# Patient Record
Sex: FEMALE | Race: WHITE | NOT HISPANIC OR LATINO | Employment: FULL TIME | URBAN - METROPOLITAN AREA
[De-identification: names, ages, dates, MRNs, and addresses within clinical notes are randomized per-mention and may not be internally consistent; named-entity substitution may affect disease eponyms.]

---

## 2019-10-29 NOTE — TELEPHONE ENCOUNTER
FUTURE VISIT INFORMATION      FUTURE VISIT INFORMATION:    Date: 11.8.19    Time: 11:15    Location: [facility]  REFERRAL INFORMATION:    Referring provider:  Dr. Anni Mcdonald    Referring providers clinic:  [place], [place]    Reason for visit/diagnosis:  severe itching all over body and trouble breathing, she does have an epipen    RECORDS REQUESTED FROM:       Clinic name Comments Records Status Imaging Status   [facility] 10.3.19 and 10.4.19 Pertinent labs Epic/CE    [facility] 5.21.19 Dr. Kelley Holliday Epic/CE    [facility] 4.24.19 Dr. Mcdonald Epic/CE    [facility] ER 4.23.19 Dr. Rolo Hawkins Epic/CE    [facility] 9.28.15 Dr. Kianna Vang Epic/CE

## 2019-11-08 ENCOUNTER — PRE VISIT (OUTPATIENT)
Dept: ALLERGY | Facility: CLINIC | Age: 44
End: 2019-11-08

## 2019-11-08 ENCOUNTER — OFFICE VISIT (OUTPATIENT)
Dept: ALLERGY | Facility: CLINIC | Age: 44
End: 2019-11-08
Payer: COMMERCIAL

## 2019-11-08 DIAGNOSIS — T78.2XXA ANAPHYLAXIS, INITIAL ENCOUNTER: Primary | ICD-10-CM

## 2019-11-08 DIAGNOSIS — Z91.018 FOOD ALLERGY: ICD-10-CM

## 2019-11-08 RX ORDER — CETIRIZINE HYDROCHLORIDE 10 MG/1
10 TABLET ORAL PRN
Qty: 2 TABLET | Refills: 3 | Status: SHIPPED | OUTPATIENT
Start: 2019-11-08 | End: 2019-11-08 | Stop reason: DRUGHIGH

## 2019-11-08 RX ORDER — PREDNISONE 50 MG/1
100 TABLET ORAL DAILY
Qty: 2 TABLET | Refills: 3 | Status: SHIPPED | OUTPATIENT
Start: 2019-11-08 | End: 2019-11-08

## 2019-11-08 RX ORDER — EPINEPHRINE 0.3 MG/.3ML
0.3 INJECTION SUBCUTANEOUS ONCE
Status: CANCELLED | OUTPATIENT
Start: 2019-11-08 | End: 2019-11-08

## 2019-11-08 RX ORDER — CETIRIZINE HYDROCHLORIDE 10 MG/1
10 TABLET ORAL ONCE
Status: CANCELLED | OUTPATIENT
Start: 2019-11-08 | End: 2019-11-08

## 2019-11-08 RX ORDER — CETIRIZINE HYDROCHLORIDE 10 MG/1
10 TABLET ORAL PRN
Qty: 2 TABLET | Refills: 3 | Status: SHIPPED | OUTPATIENT
Start: 2019-11-08 | End: 2019-11-08

## 2019-11-08 RX ORDER — PREDNISONE 50 MG/1
100 TABLET ORAL DAILY
Qty: 2 TABLET | Refills: 3 | Status: SHIPPED | OUTPATIENT
Start: 2019-11-08

## 2019-11-08 RX ORDER — PREDNISONE 50 MG/1
100 TABLET ORAL DAILY
Qty: 2 TABLET | Refills: 3 | Status: SHIPPED | OUTPATIENT
Start: 2019-11-08 | End: 2019-11-08 | Stop reason: DRUGHIGH

## 2019-11-08 RX ORDER — CETIRIZINE HYDROCHLORIDE 10 MG/1
10 TABLET ORAL PRN
Qty: 2 TABLET | Refills: 3 | Status: SHIPPED | OUTPATIENT
Start: 2019-11-08

## 2019-11-08 ASSESSMENT — PAIN SCALES - GENERAL: PAINLEVEL: NO PAIN (0)

## 2019-11-08 NOTE — PROGRESS NOTES
[facility] Note      Allergy Problem List:  1. Anaphylactic reaction to [employer] chicken yoly pasta bowl  -Was prescribed emergency set    Encounter Date: Nov 8, 2019    CC:  Chief Complaint   Patient presents with     Allergies     In April had anaphylaxis reaction to unknown. Patient saw an allergist could not figure it out, gave her an epi pen. Patient had skin testing 20+ years ago and are on allergy list, Medications are based on symptoms not testing.          History of Present Illness:  Ms. Venice Childers is a 44 year old female who presents as a referral from Dr. Anni Mathias for a second opinion for an anaphylactic reaction she had on 04/23/2019. That morning, the patient was at home. 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 She recalls having a peanut butter sandwich for breakfast and stumbled after putting out the trash due to nerve damage in a leg. She then went to sleep and woke up to have dinner which consisted only of a diet coke and a chicken yoly pasta bowl from [employer] which she was getting for the first time. She has previously had food from [employer] that contains yoly. She consumed a third of the meal and developed pruritus over her whole body 10 minutes later. She then went to go take a shower and noticed her throat starting to close. She called 911 and was given epinephrine by the paramedics which stopped the throat closing. Her blood pressure was 67/43, normally it is around 140/80. On the way to the hospital, she was continued on epinephrine and started on diphenhydramine, methylprednisolone, and D5 1/2 NS. The itching subsided when she got to the hospital. After several hours of monitoring to ensure no reaction developing, she went home. She later contacted the [employer] to see if any of the workers that night were on antibiotics as she is allergic to penicillins. The manager later checked and reported that no one was  taking antibiotics. The patient has been avoiding [employer] pasta bowl and yoly-containing food items since the reaction. She has ate from that [email or web address] store since the reaction.    On 05/21/2019, she went to go see an allergist in [place]. Blood tests were conducted to determine if she had any allergies to bees as well as the main ingredients of the meal without any positive results. She was advised to prescribed an epi pen. The patient had a prick test when she was 21-years-old with dust mites, birch trees, and a mold having positive results. Also when she was that age, she developed hives and eyes swelling at 2AM in [place], [place]. The ER doctors there informed her that she may be allergic to MSG as she had Chinese food the night before. The patient denies having hay fever and asthma. She reports that she does have Hashimoto's thyroiditis but no other autoimmune disease.    Past Medical History:   There is no problem list on file for this patient.    No past medical history on file.  No past surgical history on file.    Social History:  Patient     Family History:  No family history on file.    Medications:  No current outpatient medications on file.       Allergies   Allergen Reactions     Other  [No Clinical Screening - See Comments] Other (See Comments) and Shortness Of Breath     GRASS /DUST/  BIRCH TREES     Adhesive Tape Rash     Paper tape       Birch Trees Headache     Cipro Hc Hives     Ciprofloxacin Hives     Dust Mites      Erythromycin Hives     Sulfa Drugs Hives     Penicillins Hives, Itching and Rash       Review of Systems:  -As per HPI  -Constitutional: Otherwise feeling well today, in usual state of health.  -HEENT: Patient denies nonhealing oral sores.  -Skin: As above in HPI. No additional skin concerns.    Physical exam:  Vitals: There were no vitals taken for this visit.  GEN: This is a well developed, well-nourished female in no acute distress, in a pleasant mood.    SKIN: Not  performed.    Atopy Screen (Placed 11/09/2019)    No Substance Readings (15 min) Evaluation   POS Histamine 1mg/ml -    NEG NaCl 0.9% -      No Substance Readings (15 min) Evaluation   1 Alternaria alternata (tenuis)  -    2 Cladosporium herbarum -    3 Aspergillus fumigatus -    4 Penicillium notatum -    5 Dermatophagoides pteronyssinus -    6 Dermatophagoides farinae -    7 Dog epithelium (canis spp) -    8 Cat hair (addison catus) -    9 Cockroach   (Blatella americana & germanica) -    10 Grass mix midwest   (Deidre, Orchard, Redtop, Jefferson) -    11 Alvarado grass (sorghum halepense) -    12 Weed mix   (common Cocklebur, Lamb s quarters, rough redroot Pigweed, Dock/Sorrel) -    13 Mug wort (artemisia vulgare) -    14 Ragweed giant/short (ambrosia spp) -    15 English Plantain (plantago lanceolata) -    16 Tree mix 1 (Pecan, Maple BHR, Oak RVW, american [place], black [place]) -    17 Red cedar (juniperus virginia) -    18 Tree mix 2   (white Iam, river/red Birch, black [place], common [place], american Elm) -    19 Box elder/Maple mix (acer spp) -    20 [place] shagbark (carya ovata) -    F  White flour -      Conclusion: None      Final diagnosis:    >> Anaphylactic reaction, likely due to a component of [employer] chicken yoly pasta bowl    Prescribed emergency set of 2x50mg prednisone and 2x10mg cetirizine for allergic reactions. Instructions: If severe allergic reaction, immediately take two tablets of prednisone 50mg and 2 tablets of cetirizine 10mg and then write precise 12h retrospective diary. If less severe reaction, take only the 2 tablets of cetirizine 10mg.    Repeat prick test (atopy screen and white flour) at a future visit as the patient took an antihistamine yesterday and the prick test had a negative results for the positive control.    Recommended to continue avoiding the pasta bowls and yoly-containing items from [employer]    At the next visit, will conduct a food protein panel with the  [employer] chicken yoly pasta bowl which the patient will bring it.      Treatment prescribed/Plan:  Pt was undergoing prick test today but took an antihistamine, zyrtec, yesterday. She shares that she was not aware not to take antihistamines so we will repeat at the next visit.     Follow up in 2 weeks. Informed pt not to take any antihistamines for the prick test.      CC Anni [facility]  [address] [id number] on close of this encounter.      Staff Involved:  Scribe/Staff    I, Kevin Hart MS3, saw and examined the patient in the presence of Dr. Krishna Hines.    I spent a total of 30 minutes face to face with Venice Childers during today s office visit. Over 50% of this time was spent counseling the patient and/or coordinating care. Please see Assessment and Plan for details    --> will not charge for the prick test, because patient was on antihistamines and we did not ask previously!

## 2019-11-08 NOTE — PROGRESS NOTES
Patient had 223 prick tests placed this visit.    Control Tests (2 tests)    Standard Atopic Panel (21 tests)

## 2019-11-08 NOTE — PROGRESS NOTES
Atopy Screen (Placed 11/08/19 )    No Substance Readings (15 min) Evaluation   POS Histamine 1mg/ml -    NEG NaCl 0.9% -      No Substance Readings (15 min) Evaluation   1 Alternaria alternata (tenuis)  -    2 Cladosporium herbarum -    3 Aspergillus fumigatus -    4 Penicillium notatum -    5 Dermatophagoides pteronyssinus -    6 Dermatophagoides farinae -    7 Dog epithelium (canis spp) -    8 Cat hair (addison catus) -    9 Cockroach   (Blatella americana & germanica) -    10 Grass mix [facility]   (Deidre, Orchard, Redtop, Jefferson) -    11 Alvarado grass (sorghum halepense) -    12 Weed mix   (common Cocklebur, Lamb s quarters, rough redroot Pigweed, Dock/Sorrel) -    13 Mug wort (artemisia vulgare) -    14 Ragweed giant/short (ambrosia spp) -    15 English Plantain (plantago lanceolata) -    16 Tree mix 1 (Pecan, Maple BHR, Oak RVW, american [place], black [place]) -    17 Red cedar (juniperus virginia) -    18 Tree mix 2   (white Iam, river/red Birch, black [place], common [place], american Elm) -    19 Box elder/Maple mix (acer spp) -    20 [place] shagbark (carya ovata) -    F  White flour -      Conclusion:

## 2019-11-08 NOTE — LETTER
11/8/2019         RE: Venice Childers  [address] [address]        Dear Colleague,    Thank you for referring your patient, Venice Childers, to the [facility]. Please see a copy of my visit note below.    [facility] Note      Allergy Problem List:  1. Anaphylactic reaction to [employer] chicken yoly pasta bowl  -Was prescribed emergency set    Encounter Date: Nov 8, 2019    CC:  Chief Complaint   Patient presents with     Allergies     In April had anaphylaxis reaction to unknown. Patient saw an allergist could not figure it out, gave her an epi pen. Patient had skin testing 20+ years ago and are on allergy list, Medications are based on symptoms not testing.          History of Present Illness:  Ms. Venice Childers is a 44 year old female who presents as a referral from Dr. Anni Mathias for a second opinion for an anaphylactic reaction she had on 04/23/2019. That morning, the patient was at home. 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 She recalls having a peanut butter sandwich for breakfast and stumbled after putting out the trash due to nerve damage in a leg. She then went to sleep and woke up to have dinner which consisted only of a diet coke and a chicken yoly pasta bowl from [employer] which she was getting for the first time. She has previously had food from [employer] that contains yoly. She consumed a third of the meal and developed pruritus over her whole body 10 minutes later. She then went to go take a shower and noticed her throat starting to close. She called 911 and was given epinephrine by the paramedics which stopped the throat closing. Her blood pressure was 67/43, normally it is around 140/80. On the way to the hospital, she was continued on epinephrine and started on diphenhydramine, methylprednisolone, and D5 1/2 NS. The itching subsided when she got to the hospital. After several hours of monitoring to  ensure no reaction developing, she went home. She later contacted the [employer] to see if any of the workers that night were on antibiotics as she is allergic to penicillins. The manager later checked and reported that no one was taking antibiotics. The patient has been avoiding [employer] pasta bowl and yoly-containing food items since the reaction. She has ate from that [employer] store since the reaction.    On 05/21/2019, she went to go see an allergist in [place]. Blood tests were conducted to determine if she had any allergies to bees as well as the main ingredients of the meal without any positive results. She was advised to prescribed an epi pen. The patient had a prick test when she was 21-years-old with dust mites, birch trees, and a mold having positive results. Also when she was that age, she developed hives and eyes swelling at 2AM in [place], [place]. The ER doctors there informed her that she may be allergic to MSG as she had Chinese food the night before. The patient denies having hay fever and asthma. She reports that she does have Hashimoto's thyroiditis but no other autoimmune disease.    Past Medical History:   There is no problem list on file for this patient.    No past medical history on file.  No past surgical history on file.    Social History:  Patient     Family History:  No family history on file.    Medications:  No current outpatient medications on file.       Allergies   Allergen Reactions     Other  [No Clinical Screening - See Comments] Other (See Comments) and Shortness Of Breath     GRASS /DUST/  BIRCH TREES     Adhesive Tape Rash     Paper tape       Birch Trees Headache     Cipro Hc Hives     Ciprofloxacin Hives     Dust Mites      Erythromycin Hives     Sulfa Drugs Hives     Penicillins Hives, Itching and Rash       Review of Systems:  -As per HPI  -Constitutional: Otherwise feeling well today, in usual state of health.  -HEENT: Patient denies nonhealing oral  sores.  -Skin: As above in HPI. No additional skin concerns.    Physical exam:  Vitals: There were no vitals taken for this visit.  GEN: This is a well developed, well-nourished female in no acute distress, in a pleasant mood.    SKIN: Not performed.    Atopy Screen (Placed 11/09/2019)    No Substance Readings (15 min) Evaluation   POS Histamine 1mg/ml -    NEG NaCl 0.9% -      No Substance Readings (15 min) Evaluation   1 Alternaria alternata (tenuis)  -    2 Cladosporium herbarum -    3 Aspergillus fumigatus -    4 Penicillium notatum -    5 Dermatophagoides pteronyssinus -    6 Dermatophagoides farinae -    7 Dog epithelium (canis spp) -    8 Cat hair (addison catus) -    9 Cockroach   (Blatella americana & germanica) -    10 Grass mix midwest   (Deidre, Orchard, Redtop, Jefferson) -    11 Alvarado grass (sorghum halepense) -    12 Weed mix   (common Cocklebur, Lamb s quarters, rough redroot Pigweed, Dock/Sorrel) -    13 Mug wort (artemisia vulgare) -    14 Ragweed giant/short (ambrosia spp) -    15 English Plantain (plantago lanceolata) -    16 Tree mix 1 (Pecan, Maple BHR, Oak RVW, american [place], black [place]) -    17 Red cedar (juniperus virginia) -    18 Tree mix 2   (white Iam, river/red Birch, black [place], common [place], american Elm) -    19 Box elder/Maple mix (acer spp) -    20 [place] shagbark (carya ovata) -    F  White flour -      Conclusion: None      Final diagnosis:    >> Anaphylactic reaction, likely due to a component of [employer] chicken [employer] bowl    Prescribed emergency set of 2x50mg prednisone and 2x10mg cetirizine for allergic reactions. Instructions: If severe allergic reaction, immediately take two tablets of prednisone 50mg and 2 tablets of cetirizine 10mg and then write precise 12h retrospective diary. If less severe reaction, take only the 2 tablets of cetirizine 10mg.    Repeat prick test (atopy screen and white flour) at a future visit as the patient took an antihistamine  yesterday and the prick test had a negative results for the positive control.    Recommended to continue avoiding the pasta bowls and yoly-containing items from [employer]    At the next visit, will conduct a food protein panel with the [employer] chicken yoly pasta bowl which the patient will bring it.      Treatment prescribed/Plan:  Pt was undergoing prick test today but took an antihistamine, zyrtec, yesterday. She shares that she was not aware not to take antihistamines so we will repeat at the next visit.     Follow up in 2 weeks. Informed pt not to take any antihistamines for the prick test.      CC [facility]  [address] [place] [address] on close of this encounter.      Staff Involved:  Scribe/Staff    I, Kevin Hart MS3, saw and examined the patient in the presence of Dr. Krishna Hines.    I spent a total of 30 minutes face to face with Venice Childers during today s office visit. Over 50% of this time was spent counseling the patient and/or coordinating care. Please see Assessment and Plan for details    --> will not charge for the prick test, because patient was on antihistamines and we did not ask previously!              Patient had 223 prick tests placed this visit.    Control Tests (2 tests)    Standard Atopic Panel (21 tests)          Atopy Screen (Placed 11/08/19 )    No Substance Readings (15 min) Evaluation   POS Histamine 1mg/ml -    NEG NaCl 0.9% -      No Substance Readings (15 min) Evaluation   1 Alternaria alternata (tenuis)  -    2 Cladosporium herbarum -    3 Aspergillus fumigatus -    4 Penicillium notatum -    5 Dermatophagoides pteronyssinus -    6 Dermatophagoides farinae -    7 Dog epithelium (canis spp) -    8 Cat hair (addison catus) -    9 Cockroach   (Blatella americana & germanica) -    10 Grass mix midwest   (Deidre, Orchard, Redtop, [place]) -    11 Alvarado grass (sorghum halepense) -    12 Weed mix   (common Cocklebur,  Hansen s quarters, rough redroot Pigweed, Dock/Sorrel) -    13 Mug wort (artemisia vulgare) -    14 Ragweed giant/short (ambrosia spp) -    15 English Plantain (plantago lanceolata) -    16 Tree mix 1 (Pecan, Maple BHR, Oak RVW, american [place], black [place]) -    17 Red cedar (juniperus virginia) -    18 Tree mix 2   (white Iam, river/red Birch, black [place], common [place], american Elm) -    19 Box elder/Maple mix (acer spp) -    20 [place] shagbark (carya ovata) -    F  White flour -      Conclusion:                Again, thank you for allowing me to participate in the care of your patient.        Sincerely,        Krishna Hines MD

## 2019-11-08 NOTE — NURSING NOTE
Chief Complaint   Patient presents with     Allergies     In April had anaphylaxis reaction to unknown. Patient saw an allergist could not figure it out, gave her an epi pen. Patient had skin testing 20+ years ago and are on allergy list, Medications are based on symptoms not testing.      Elvi Cardozo LPN

## 2019-11-22 ENCOUNTER — OFFICE VISIT (OUTPATIENT)
Dept: ALLERGY | Facility: CLINIC | Age: 44
End: 2019-11-22
Payer: COMMERCIAL

## 2019-11-22 DIAGNOSIS — Z91.018 FOOD ALLERGY: ICD-10-CM

## 2019-11-22 DIAGNOSIS — T78.2XXA ANAPHYLAXIS, INITIAL ENCOUNTER: Primary | ICD-10-CM

## 2019-11-22 ASSESSMENT — PAIN SCALES - GENERAL: PAINLEVEL: NO PAIN (0)

## 2019-11-22 NOTE — NURSING NOTE
Chief Complaint   Patient presents with     Allergy Testing Followup     repeat prick tests along with patient's own product. Patient has been of her antihistamines     Elvi Cardozo LPN

## 2019-11-22 NOTE — PROGRESS NOTES
[facility] Allergy Note    Allergy Clinic  [facility]  [address] [place] [id number]    Allergy Problem List:  1. Anaphylactic reaction to [employer] chicken yoly pasta bowl  -Was prescribed emergency set    Encounter Date: Nov 22, 2019    CC:  Chief Complaint   Patient presents with     Allergy Testing Followup     repeat prick tests along with patient's own product. Patient has been of her antihistamines       History of Present Illness:  Ms. Venice Childers is a 44 year old female who presents as a referral from Dr. Anni Mathias for a second opinion for an anaphylactic reaction she had on 04/23/2019. That morning, the patient was at home. 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 She recalls having a peanut butter sandwich for breakfast and stumbled after putting out the trash due to nerve damage in a leg. She then went to sleep and woke up to have dinner which consisted only of a diet coke and a chicken yoly pasta bowl from [employer] which she was getting for the first time. She has previously had food from [employer] that contains yoly. She consumed a third of the meal and developed pruritus over her whole body 10 minutes later. She then went to go take a shower and noticed her throat starting to close. She called 911 and was given epinephrine by the paramedics which stopped the throat closing. Her blood pressure was 67/43, normally it is around 140/80. On the way to the hospital, she was continued on epinephrine and started on diphenhydramine, methylprednisolone, and D5 1/2 NS. The itching subsided when she got to the hospital. After several hours of monitoring to ensure no reaction developing, she went home. She later contacted the [employer] to see if any of the workers that night were on antibiotics as she is allergic to penicillins. The manager later checked and reported that no one was taking  antibiotics. The patient has been avoiding [employer] pasta bowl and yoly-containing food items since the reaction. She has ate from that [employer] store since the reaction.    On 05/21/2019, she went to go see an allergist in [place]. Blood tests were conducted to determine if she had any allergies to bees as well as the main ingredients of the meal without any positive results. She was advised to prescribed an epi pen. The patient had a prick test when she was 21-years-old with dust mites, birch trees, and a mold having positive results. Also when she was that age, she developed hives and eyes swelling at 2AM in [place], [place]. The ER doctors there informed her that she may be allergic to MSG as she had Chinese food the night before. The patient denies having hay fever and asthma. She reports that she does have Hashimoto's thyroiditis but no other autoimmune disease.    Hx since 11/8/19:  Pt comes in today, 11/22/19, for a repeat of the atopy screen prick testing. Last time in this clinic she had undergone atopy screen prick testing but was found to be on antihistamines. She was given an emergency set before rescheduling. Patient's Hx indicates that she has a severe immediate type reaction to penicillins/minor penicillins. There were incidents of droplet exposure with amoxicillin on the hand during preparation of amoxicillin injection, and she developed in 30 minutes generalized urticaria. In addition, she has observed that her  was feeding the dog amoxicillin and touched things in the kitchen.   The patient is otherwise feeling well overall. No other/additional allergy concerns at this time.      Past Medical History:   There is no problem list on file for this patient.    No past medical history on file.  No past surgical history on file.    Social History:  Patient works as a nurse.    Family History:  No family history on file.    Medications:  Current Outpatient Medications   Medication Sig  Dispense Refill     cetirizine (ZYRTEC) 10 MG tablet Take 1 tablet (10 mg) by mouth as needed for allergies (emergency set) 2 tablet 3     predniSONE (DELTASONE) 50 MG tablet Take 2 tablets (100 mg) by mouth daily 2 tablet 3       Allergies   Allergen Reactions     Other  [No Clinical Screening - See Comments] Other (See Comments) and Shortness Of Breath     GRASS /DUST/  BIRCH TREES     Adhesive Tape Rash     Paper tape       Birch Trees Headache     Cipro Hc Hives     Ciprofloxacin Hives     Dust Mites      Erythromycin Hives     Sulfa Drugs Hives     Penicillins Hives, Itching and Rash       Review of Systems:  -As per HPI  -Constitutional: Otherwise feeling well today, in usual state of health.  -HEENT: Patient denies nonhealing oral sores.  -Skin: As above in HPI. No additional skin concerns.    Physical exam:  Vitals: There were no vitals taken for this visit.  GEN: This is a well developed, well-nourished female in no acute distress, in a pleasant mood.    SKIN: Not performed.    Atopy Screen (Placed 11/09/2019)    No Substance Readings (15 min) Evaluation   POS Histamine 1mg/ml -    NEG NaCl 0.9% -      No Substance Readings (15 min) Evaluation   1 Alternaria alternata (tenuis)  -    2 Cladosporium herbarum -    3 Aspergillus fumigatus -    4 Penicillium notatum -    5 Dermatophagoides pteronyssinus -    6 Dermatophagoides farinae -    7 Dog epithelium (canis spp) -    8 Cat hair (addison catus) -    9 Cockroach   (Blatella americana & germanica) -    10 Grass mix midwest   (Deidre, Orchard, Redtop, Jefferson) -    11 Alvarado grass (sorghum halepense) -    12 Weed mix   (common Cocklebur, Lamb s quarters, rough redroot Pigweed, Dock/Sorrel) -    13 Mug wort (artemisia vulgare) -    14 Ragweed giant/short (ambrosia spp) -    15 English Plantain (plantago lanceolata) -    16 Tree mix 1 (Pecan, Maple BHR, Oak RVW, american [place], black [place]) -    17 Red cedar (juniperus virginia) -    18 Tree mix 2   (white Iam,  river/red Birch, black [place], common [place], american Elm) -    19 Box elder/Maple mix (acer spp) -    20 [place] shagbark (carya ovata) -    F  White flour -      Conclusion: None    Patient had 24 prick tests placed this visit.    Control Tests (2 tests)    Standard Atopic Panel (21 tests)    Personal Products ( 1 tests)    Atopy Screen (Placed 11/22/19 )    No Substance Readings (15 min) Evaluation   POS Histamine 1mg/ml ++    NEG NaCl 0.9% -      No Substance Readings (15 min) Evaluation   1 Alternaria alternata (tenuis)  -    2 Cladosporium herbarum -    3 Aspergillus fumigatus -    4 Penicillium notatum -    5 Dermatophagoides pteronyssinus -    6 Dermatophagoides farinae -    7 Dog epithelium (canis spp) -    8 Cat hair (addison catus) -    9 Cockroach   (Blatella americana & germanica) -    10 Grass mix midwest   ([place]idre, Orchard, Red[place], [place]) -    11 Alvarado grass (sorghum halepense) -    12 Weed mix   (common Cocklebur, Lamb s quarters, rough redroot Pigweed, Dock/Sorrel) -    13 Mug wort (artemisia vulgare) -    14 Ragweed giant/short (ambrosia spp) -    15 English Plantain (plantago lanceolata) -    16 Tree mix 1 (Pecan, Maple BHR, Oak RVW, american [place], black [place]) -    17 Red cedar (juniperus virginia) -    18 Tree mix 2   (white Iam, river/red Birch, black [place], common [place], american Elm) -    19 Box elder/Maple mix (acer spp) -    20 [place] shagbark (carya ovata) -    21 [employer] Gallo Bread Bowl     22 White flour  -      Conclusion: Negative atopy screen prick test. No signs for atopic predisposition.       Milk, Meat, Egg, and Grains (11/22/19 )    No Substance Readings (15min) Evaluation   POS Histamine 1mg/ml ++    NEG NaCl 0.9% -      No Substance Readings (15min) Evaluation   1 Cows milk whole (karina paras) -    2 Casein (karina paras) -    3 Beef (karina paras) -    4 Goat milk -    5 Lamb (ovis wild) -    6 Egg white (gallus gallus) -    7 Egg yolk (gallus gallus) -    8  Chicken (gallus gallus) -    9 Turkey (meleagris gallopavo) -    10 Pork (chema scrofa) -    11 Barley (hordeum vulgare) +    12 Buckwheat (fagopyrum esculentum) -    13 Corn (michael mais) -    14 Hops (humulus lupulus)  +    15 Malt (hordeum vulgare)  +    16 Oat (federico sativa) -    17 Rice (oryza sativa) -    18 Rye (secale cereale) -    19 Whole wheat (triticum aestivum) +    20  White wheat flour - Patients own      Conclusion: There was a certain dermographism on the back with a few food allergens especially on the wheat panel, slightly positive. However, compared to the positive control, the reaction was very slight that I consider it as not relevant.       Final diagnosis:    >> Anaphylactic reaction, likely due to a component of [employer] chicken yoly pasta bowl    Previously prescribed emergency set of 2x50mg prednisone and 2x10mg cetirizine for allergic reactions. Instructions: If severe allergic reaction, immediately take two tablets of prednisone 50mg and 2 tablets of cetirizine 10mg and then write precise 12h retrospective diary. If less severe reaction, take only the 2 tablets of cetirizine 10mg.    Recommended to continue avoiding the pasta bowls and yoly-containing items from [employer]    >> Suspicion for severe immediate-type sensitivity to penicillin G and minor penicillins    Explore possible cross reaction into cephalosporin and meropenem     Plan prick and intradermal test to Penicllin G, Ampicillin, cefazolin, ceftriaxone, and meropenem      Treatment prescribed/Plan:  - Pt to use the emergency set if needed, and then follow up with us for more focused allergy testing.     Follow up as needed.       [facility]  [address] [address] on close of this encounter.      Staff Involved:    Scribe Disclosure  I, Liza Mccurdy, am serving as a scribe to document services personally performed by Dr. Krishna Hines, based on data collection and  the provider's statements to me.     I spent a total of 32 minutes face to face with Venice Childers during today s office visit. Over 50% of this time was spent counseling the patient and/or coordinating care.  Please see Assessment and Plan for details.  This excludes any time spent performing prick tests

## 2019-11-22 NOTE — LETTER
11/22/2019         RE: Venice Childers  [address] [id number]        Dear Colleague,    Thank you for referring your patient, Venice Childers, to the [facility] ALLERGY. Please see a copy of my visit note below.    Patient had 24 prick tests placed this visit.    Control Tests (2 tests)    Standard Atopic Panel (21 tests)    Personal Products ( 1 tests)    Atopy Screen (Placed 11/22/19 )    No Substance Readings (15 min) Evaluation   POS Histamine 1mg/ml -    NEG NaCl 0.9% -      No Substance Readings (15 min) Evaluation   1 Alternaria alternata (tenuis)  -    2 Cladosporium herbarum -    3 Aspergillus fumigatus -    4 Penicillium notatum -    5 Dermatophagoides pteronyssinus -    6 Dermatophagoides farinae -    7 Dog epithelium (canis spp) -    8 Cat hair (addison catus) -    9 Cockroach   (Blatella americana & germanica) -    10 Grass mix midwest   (Deidre, Orchard, Redtop, [place]) -    11 Alvarado grass (sorghum halepense) -    12 Weed mix   (common Cocklebur, Lamb s quarters, rough redroot Pigweed, Dock/Sorrel) -    13 Mug wort (artemisia vulgare) -    14 Ragweed giant/short (ambrosia spp) -    15 English Plantain (plantago lanceolata) -    16 Tree mix 1 (Pecan, Maple BHR, Oak RVW, american [place], black [place]) -    17 Red cedar (juniperus virginia) -    18 Tree mix 2   (white Iam, river/red Birch, black [place], common [place], american Elm) -    19 Box elder/Maple mix (acer spp) -    20 [place] shagbark (carya ovata) -    21 [employer] Yoly Bread Bowl     22 White flour  -      Conclusion:      [facility] Allergy Note    Allergy Clinic  [facility]  [address] [place] [id number]    Allergy Problem List:  1. Anaphylactic reaction to [employer] chicken yoly pasta bowl  -Was prescribed emergency set    Encounter Date: Nov 22, 2019    CC:  Chief Complaint   Patient presents with     Allergy Testing Followup      repeat prick tests along with patient's own product. Patient has been of her antihistamines       History of Present Illness:  Ms. Venice Childers is a 44 year old female who presents as a referral from Dr. Anni Mathias for a second opinion for an anaphylactic reaction she had on 04/23/2019. That morning, the patient was at home. 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 She recalls having a peanut butter sandwich for breakfast and stumbled after putting out the trash due to nerve damage in a leg. She then went to sleep and woke up to have dinner which consisted only of a diet coke and a chicken yoly pasta bowl from [employer] which she was getting for the first time. She has previously had food from [employer] that contains yoly. She consumed a third of the meal and developed pruritus over her whole body 10 minutes later. She then went to go take a shower and noticed her throat starting to close. She called 911 and was given epinephrine by the paramedics which stopped the throat closing. Her blood pressure was 67/43, normally it is around 140/80. On the way to the hospital, she was continued on epinephrine and started on diphenhydramine, methylprednisolone, and D5 1/2 NS. The itching subsided when she got to the hospital. After several hours of monitoring to ensure no reaction developing, she went home. She later contacted the [employer] to see if any of the workers that night were on antibiotics as she is allergic to penicillins. The manager later checked and reported that no one was taking antibiotics. The patient has been avoiding [employer] pasta bowl and yoly-containing food items since the reaction. She has ate from that [employer] since the reaction.    On 05/21/2019, she went to go see an allergist in [place]. Blood tests were conducted to determine if she had any allergies to bees as well as the main ingredients of the meal without any positive results. She was advised  to prescribed an epi pen. The patient had a prick test when she was 21-years-old with dust mites, birch trees, and a mold having positive results. Also when she was that age, she developed hives and eyes swelling at 2AM in [place], [place]. The ER doctors there informed her that she may be allergic to MSG as she had Chinese food the night before. The patient denies having hay fever and asthma. She reports that she does have Hashimoto's thyroiditis but no other autoimmune disease.    Hx since 11/8/19:  Pt comes in today, 11/22/19, for a repeat of the atopy screen prick testing. Last time in this clinic she had undergone atopy screen prick testing but was found to be on antihistamines. She was given an emergency set before rescheduling. Patient's Hx indicates that she has a severe immediate type reaction to penicillins/minor penicillins. There were incidents of droplet exposure with amoxicillin on the hand during preparation of amoxicillin injection, and she developed in 30 minutes generalized urticaria. In addition, she has observed that her  was feeding the dog amoxicillin and touched things in the kitchen.   The patient is otherwise feeling well overall. No other/additional allergy concerns at this time.      Past Medical History:   There is no problem list on file for this patient.    No past medical history on file.  No past surgical history on file.    Social History:  Patient works as a nurse.    Family History:  No family history on file.    Medications:  Current Outpatient Medications   Medication Sig Dispense Refill     cetirizine (ZYRTEC) 10 MG tablet Take 1 tablet (10 mg) by mouth as needed for allergies (emergency set) 2 tablet 3     predniSONE (DELTASONE) 50 MG tablet Take 2 tablets (100 mg) by mouth daily 2 tablet 3       Allergies   Allergen Reactions     Other  [No Clinical Screening - See Comments] Other (See Comments) and Shortness Of Breath     GRASS /DUST/  BIRCH TREES     Adhesive Tape  Rash     Paper tape       Birch Trees Headache     Cipro Hc Hives     Ciprofloxacin Hives     Dust Mites      Erythromycin Hives     Sulfa Drugs Hives     Penicillins Hives, Itching and Rash       Review of Systems:  -As per HPI  -Constitutional: Otherwise feeling well today, in usual state of health.  -HEENT: Patient denies nonhealing oral sores.  -Skin: As above in HPI. No additional skin concerns.    Physical exam:  Vitals: There were no vitals taken for this visit.  GEN: This is a well developed, well-nourished female in no acute distress, in a pleasant mood.    SKIN: Not performed.    Atopy Screen (Placed 11/09/2019)    No Substance Readings (15 min) Evaluation   POS Histamine 1mg/ml -    NEG NaCl 0.9% -      No Substance Readings (15 min) Evaluation   1 Alternaria alternata (tenuis)  -    2 Cladosporium herbarum -    3 Aspergillus fumigatus -    4 Penicillium notatum -    5 Dermatophagoides pteronyssinus -    6 Dermatophagoides farinae -    7 Dog epithelium (canis spp) -    8 Cat hair (addison catus) -    9 Cockroach   (Blatella americana & germanica) -    10 Grass mix midwest   (Deidre, Orchard, Redtop, [place]) -    11 Alvarado grass (sorghum halepense) -    12 Weed mix   (common Cocklebur, Lamb s quarters, rough redroot Pigweed, Dock/Sorrel) -    13 Mug wort (artemisia vulgare) -    14 Ragweed giant/short (ambrosia spp) -    15 English Plantain (plantago lanceolata) -    16 Tree mix 1 (Pecan, Maple BHR, Oak RVW, american [place], black [place]) -    17 Red cedar (juniperus virginia) -    18 Tree mix 2   (white Iam, river/red Birch, black [place], common [place], american Elm) -    19 Box elder/Maple mix (acer spp) -    20 [place] shagbark (carya ovata) -    F  White flour -      Conclusion: None    Patient had 24 prick tests placed this visit.    Control Tests (2 tests)    Standard Atopic Panel (21 tests)    Personal Products ( 1 tests)    Atopy Screen (Placed 11/22/19 )    No Substance Readings (15 min)  Evaluation   POS Histamine 1mg/ml ++    NEG NaCl 0.9% -      No Substance Readings (15 min) Evaluation   1 Alternaria alternata (tenuis)  -    2 Cladosporium herbarum -    3 Aspergillus fumigatus -    4 Penicillium notatum -    5 Dermatophagoides pteronyssinus -    6 Dermatophagoides farinae -    7 Dog epithelium (canis spp) -    8 Cat hair (addison catus) -    9 Cockroach   (Blatella americana & germanica) -    10 Grass mix midwest   (Deidre, Orchard, Redtop, [place]) -    11 Alvarado grass (sorghum halepense) -    12 Weed mix   (common Cocklebur, Lamb s quarters, rough redroot Pigweed, Dock/Sorrel) -    13 Mug wort (artemisia vulgare) -    14 Ragweed giant/short (ambrosia spp) -    15 English Plantain (plantago lanceolata) -    16 Tree mix 1 (Pecan, Maple BHR, Oak RVW, american [place], black [place]) -    17 Red cedar (juniperus virginia) -    18 Tree mix 2   (white Iam, river/red Birch, black [place], common [place], american Elm) -    19 Box elder/Maple mix (acer spp) -    20 [place] shagbark (carya ovata) -    21 [employer] Gallo Bread Bowl     22 White flour  -      Conclusion: Negative atopy screen prick test. No signs for atopic predisposition.       Milk, Meat, Egg, and Grains (11/22/19 )    No Substance Readings (15min) Evaluation   POS Histamine 1mg/ml ++    NEG NaCl 0.9% -      No Substance Readings (15min) Evaluation   1 Cows milk whole (karina paras) -    2 Casein (karina paras) -    3 Beef (karina paras) -    4 Goat milk -    5 Lamb (ovis wild) -    6 Egg white (gallus gallus) -    7 Egg yolk (gallus gallus) -    8 Chicken (gallus gallus) -    9 Turkey (meleagris gallopavo) -    10 Pork (chema scrofa) -    11 Barley (hordeum vulgare) +    12 Buckwheat (fagopyrum esculentum) -    13 Corn (michael mais) -    14 Hops (humulus lupulus)  +    15 Malt (hordeum vulgare)  +    16 Oat (federico sativa) -    17 Rice (oryza sativa) -    18 Rye (secale cereale) -    19 Whole wheat (triticum aestivum) +    20  White wheat flour -  Patients own      Conclusion: There was a certain dermographism on the back with a few food allergens especially on the wheat panel, slightly positive. However, compared to the positive control, the reaction was very slight that I consider it as not relevant.       Final diagnosis:    >> Anaphylactic reaction, likely due to a component of [employer] chicken yoly pasta bowl    Previously prescribed emergency set of 2x50mg prednisone and 2x10mg cetirizine for allergic reactions. Instructions: If severe allergic reaction, immediately take two tablets of prednisone 50mg and 2 tablets of cetirizine 10mg and then write precise 12h retrospective diary. If less severe reaction, take only the 2 tablets of cetirizine 10mg.    Recommended to continue avoiding the pasta bowls and yoly-containing items from [employer]    >> Suspicion for severe immediate-type sensitivity to penicillin G and minor penicillins    Explore possible cross reaction into cephalosporin and meropenem     Plan prick and intradermal test to Penicllin G, Ampicillin, cefazolin, ceftriaxone, and meropenem      Treatment prescribed/Plan:  - Pt to use the emergency set if needed, and then follow up with us for more focused allergy testing.     Follow up as needed.       CC [facility]  [address] [place] [address] on close of this encounter.      Staff Involved:    Scribe Disclosure  I, Liza Mccurdy, am serving as a scribe to document services personally performed by Dr. Krishna Hines, based on data collection and the provider's statements to me.     I spent a total of 32 minutes face to face with Venice Childers during today s office visit. Over 50% of this time was spent counseling the patient and/or coordinating care.  Please see Assessment and Plan for details.  This excludes any time spent performing prick tests                                Again, thank you for allowing me to participate in the care of  your patient.        Sincerely,        Krishna Hines MD

## 2019-11-22 NOTE — PROGRESS NOTES
Patient had 24 prick tests placed this visit.    Control Tests (2 tests)    Standard Atopic Panel (21 tests)    Personal Products ( 1 tests)    Atopy Screen (Placed 11/22/19 )    No Substance Readings (15 min) Evaluation   POS Histamine 1mg/ml -    NEG NaCl 0.9% -      No Substance Readings (15 min) Evaluation   1 Alternaria alternata (tenuis)  -    2 Cladosporium herbarum -    3 Aspergillus fumigatus -    4 Penicillium notatum -    5 Dermatophagoides pteronyssinus -    6 Dermatophagoides farinae -    7 Dog epithelium (canis spp) -    8 Cat hair (addison catus) -    9 Cockroach   (Blatella americana & germanica) -    10 Grass mix midwest   (Deidre, Orchard, Redtop, [place]) -    11 Alvarado grass (sorghum halepense) -    12 Weed mix   (common Cocklebur, Lamb s quarters, rough redroot Pigweed, Dock/Sorrel) -    13 Mug wort (artemisia vulgare) -    14 Ragweed giant/short (ambrosia spp) -    15 English Plantain (plantago lanceolata) -    16 Tree mix 1 (Pecan, Maple BHR, Oak RVW, american [place], black [place]) -    17 Red cedar (juniperus virginia) -    18 Tree mix 2   (white Iam, river/red Birch, black [place], common [place], american Elm) -    19 Box elder/Maple mix (acer spp) -    20 [place] shagbark (carya ovata) -    21 [employer] Gallo Bread Bowl     22 White flour  -      Conclusion:

## 2019-11-26 ENCOUNTER — TELEPHONE (OUTPATIENT)
Dept: ALLERGY | Facility: CLINIC | Age: 44
End: 2019-11-26

## 2019-11-26 DIAGNOSIS — Z88.9 DRUG ALLERGY: Primary | ICD-10-CM

## 2019-11-27 ENCOUNTER — OFFICE VISIT (OUTPATIENT)
Dept: ALLERGY | Facility: CLINIC | Age: 44
End: 2019-11-27
Payer: COMMERCIAL

## 2019-11-27 DIAGNOSIS — L50.0 ALLERGIC URTICARIA: ICD-10-CM

## 2019-11-27 DIAGNOSIS — Z88.9 DRUG ALLERGY: Primary | ICD-10-CM

## 2019-11-27 RX ORDER — AMPICILLIN 1 G/1
1 INJECTION, POWDER, FOR SOLUTION INTRAMUSCULAR; INTRAVENOUS ONCE
Status: COMPLETED | OUTPATIENT
Start: 2019-11-27 | End: 2019-11-27

## 2019-11-27 RX ORDER — PENICILLIN G POTASSIUM 5000000 [IU]/1
5000000 INJECTION, POWDER, FOR SOLUTION INTRAMUSCULAR; INTRAVENOUS ONCE
Status: COMPLETED | OUTPATIENT
Start: 2019-11-27 | End: 2019-11-27

## 2019-11-27 RX ORDER — CEFAZOLIN SODIUM 1 G
500 VIAL (EA) INJECTION ONCE
Status: COMPLETED | OUTPATIENT
Start: 2019-11-27 | End: 2019-11-27

## 2019-11-27 RX ORDER — MEROPENEM 500 MG/1
500 INJECTION, POWDER, FOR SOLUTION INTRAVENOUS ONCE
Status: COMPLETED | OUTPATIENT
Start: 2019-11-27 | End: 2019-11-27

## 2019-11-27 RX ORDER — CEFTRIAXONE SODIUM 1 G
250 VIAL (EA) INJECTION ONCE
Status: COMPLETED | OUTPATIENT
Start: 2019-11-27 | End: 2019-11-27

## 2019-11-27 RX ORDER — CEFTAZIDIME 1 G/1
INJECTION, POWDER, FOR SOLUTION INTRAMUSCULAR; INTRAVENOUS
Qty: 1 EACH | Refills: 0 | Status: SHIPPED | OUTPATIENT
Start: 2019-11-27

## 2019-11-27 RX ADMIN — PENICILLIN G POTASSIUM 5000000 UNITS: 5000000 INJECTION, POWDER, FOR SOLUTION INTRAMUSCULAR; INTRAVENOUS at 14:42

## 2019-11-27 RX ADMIN — AMPICILLIN 1 G: 1 INJECTION, POWDER, FOR SOLUTION INTRAMUSCULAR; INTRAVENOUS at 14:33

## 2019-11-27 RX ADMIN — Medication 250 MG: at 14:34

## 2019-11-27 RX ADMIN — MEROPENEM 500 MG: 500 INJECTION, POWDER, FOR SOLUTION INTRAVENOUS at 14:41

## 2019-11-27 RX ADMIN — Medication 500 MG: at 14:27

## 2019-11-27 ASSESSMENT — PAIN SCALES - GENERAL: PAINLEVEL: NO PAIN (0)

## 2019-11-27 NOTE — PROGRESS NOTES
[facility] Allergy Note    Allergy Clinic  [facility]  [address] [place] [id number]    Allergy Problem List:  1. Anaphylactic reaction to [employer] chicken yoly pasta bowl  -Was prescribed emergency set  2. 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 Venice Childers is a 44 year old female who presents as a referral from Dr. Anni Mathias for a second opinion for an anaphylactic reaction she had on 04/23/2019. That morning, the patient was at home. 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 She recalls having a peanut butter sandwich for breakfast and stumbled after putting out the trash due to nerve damage in a leg. She then went to sleep and woke up to have dinner which consisted only of a diet coke and a chicken yoly pasta bowl from [employer] which she was getting for the first time. She has previously had food from [employer] that contains yoly. She consumed a third of the meal and developed pruritus over her whole body 10 minutes later. She then went to go take a shower and noticed her throat starting to close. She called 911 and was given epinephrine by the paramedics which stopped the throat closing. Her blood pressure was 67/43, normally it is around 140/80. On the way to the hospital, she was continued on epinephrine and started on diphenhydramine, methylprednisolone, and D5 1/2 NS. The itching subsided when she got to the hospital. After several hours of monitoring to  ensure no reaction developing, she went home. She later contacted the [employer] to see if any of the workers that night were on antibiotics as she is allergic to penicillins. The manager later checked and reported that no one was taking antibiotics. The patient has been avoiding [employer] pasta bowl and yoly-containing food items since the reaction. She has ate from that [employer] since the reaction.    On 05/21/2019, she went to go see an allergist in [place]. Blood tests were conducted to determine if she had any allergies to bees as well as the main ingredients of the meal without any positive results. She was advised to prescribed an epi pen. The patient had a prick test when she was 21-years-old with dust mites, birch trees, and a mold having positive results. Also when she was that age, she developed hives and eyes swelling at 2AM in [place], [place]. The ER doctors there informed her that she may be allergic to MSG as she had Chinese food the night before. The patient denies having hay fever and asthma. She reports that she does have Hashimoto's thyroiditis but no other autoimmune disease.    Hx since 11/8/19:  Pt comes in today, 11/22/19, for a repeat of the atopy screen prick testing. Last time in this clinic she had undergone atopy screen prick testing but was found to be on antihistamines. She was given an emergency set before rescheduling. Patient's Hx indicates that she has a severe immediate type reaction to penicillins/minor penicillins. There were incidents of droplet exposure with amoxicillin on the hand during preparation of amoxicillin injection, and she developed in 30 minutes generalized urticaria. In addition, she has observed that her  was feeding the dog amoxicillin and touched things in the kitchen.     History as of 11/27/19:    Patient returns to clinic today for antibiotic prick and intradermal testing. She reports she had ceftriaxone during a surgery about a year  ago without reaction. No additional concerns discussed.        Past Medical History:   There is no problem list on file for this patient.    No past medical history on file.  No past surgical history on file.    Social History:  Patient works as a nurse.    Family History:  No family history on file.    Medications:  Current Outpatient Medications   Medication Sig Dispense Refill     cetirizine (ZYRTEC) 10 MG tablet Take 1 tablet (10 mg) by mouth as needed for allergies (emergency set) 2 tablet 3     predniSONE (DELTASONE) 50 MG tablet Take 2 tablets (100 mg) by mouth daily 2 tablet 3       Allergies   Allergen Reactions     Other  [No Clinical Screening - See Comments] Other (See Comments) and Shortness Of Breath     GRASS /DUST/  BIRCH TREES     Adhesive Tape Rash     Paper tape       Birch Trees Headache     Cipro Hc Hives     Ciprofloxacin Hives     Dust Mites      Erythromycin Hives     Sulfa Drugs Hives     Penicillins Hives, Itching and Rash       Review of Systems:  -As per HPI  -Constitutional: Otherwise feeling well today, in usual state of health.  -HEENT: Patient denies nonhealing oral sores.  -Skin: As above in HPI. No additional skin concerns.    Physical exam:  Vitals: There were no vitals taken for this visit.  GEN: This is a well developed, well-nourished female in no acute distress, in a pleasant mood.    SKIN: Not performed.    Atopy Screen (Placed 11/09/2019)    No Substance Readings (15 min) Evaluation   POS Histamine 1mg/ml -    NEG NaCl 0.9% -      No Substance Readings (15 min) Evaluation   1 Alternaria alternata (tenuis)  -    2 Cladosporium herbarum -    3 Aspergillus fumigatus -    4 Penicillium notatum -    5 Dermatophagoides pteronyssinus -    6 Dermatophagoides farinae -    7 Dog epithelium (canis spp) -    8 Cat hair (addison catus) -    9 Cockroach   (Blatella americana & germanica) -    10 Grass mix midwest   (Deidre, Orchard, Redtop, Jefferson) -    11 Alvarado grass (sorghum halepense) -     12 Weed mix   (common Cocklebur, Lamb s quarters, rough redroot Pigweed, Dock/Sorrel) -    13 Mug wort (artemisia vulgare) -    14 Ragweed giant/short (ambrosia spp) -    15 English Plantain (plantago lanceolata) -    16 Tree mix 1 (Pecan, Maple BHR, Oak RVW, american Cocolalla, black [employer]) -    17 Red cedar (juniperus virginia) -    18 Tree mix 2   (white Iam, river/red Birch, black Williams, common [place], american Elm) -    19 Box elder/Maple mix (acer spp) -    20 Olmsted shagbark (carya ovata) -    F  White flour -      Conclusion: None    Patient had 24 prick tests placed this visit.    Control Tests (2 tests)    Standard Atopic Panel (21 tests)    Personal Products ( 1 tests)    Atopy Screen (Placed 11/22/19 )    No Substance Readings (15 min) Evaluation   POS Histamine 1mg/ml ++    NEG NaCl 0.9% -      No Substance Readings (15 min) Evaluation   1 Alternaria alternata (tenuis)  -    2 Cladosporium herbarum -    3 Aspergillus fumigatus -    4 Penicillium notatum -    5 Dermatophagoides pteronyssinus -    6 Dermatophagoides farinae -    7 Dog epithelium (canis spp) -    8 Cat hair (addison catus) -    9 Cockroach   (Blatella americana & germanica) -    10 Grass mix midwest   (Deidre, Orchard, Redtop, Jefferson) -    11 Alvarado grass (sorghum halepense) -    12 Weed mix   (common Cocklebur, Lamb s quarters, rough redroot Pigweed, Dock/Sorrel) -    13 Mug wort (artemisia vulgare) -    14 Ragweed giant/short (ambrosia spp) -    15 English Plantain (plantago lanceolata) -    16 Tree mix 1 (Pecan, Maple BHR, Oak RVW, american Cocolalla, black [employer]) -    17 Red cedar (juniperus virginia) -    18 Tree mix 2   (white Iam, river/red Birch, black Williams, common [place], american Elm) -    19 Box elder/Maple mix (acer spp) -    20 Olmsted shagbark (carya ovata) -    21 [facility] Bread Bowl     22 White flour  -      Conclusion: Negative atopy screen prick test. No signs for atopic predisposition.       Milk,  Meat, Egg, and Grains (11/22/19 )    No Substance Readings (15min) Evaluation   POS Histamine 1mg/ml ++    NEG NaCl 0.9% -      No Substance Readings (15min) Evaluation   1 Cows milk whole (karina paras) -    2 Casein (karina paras) -    3 Beef (karina paras) -    4 Goat milk -    5 Lamb (ovis wild) -    6 Egg white (gallus gallus) -    7 Egg yolk (gallus gallus) -    8 Chicken (gallus gallus) -    9 Turkey (meleagris gallopavo) -    10 Pork (chema scrofa) -    11 Barley (hordeum vulgare) +    12 Buckwheat (fagopyrum esculentum) -    13 Corn (michael mais) -    14 Hops (humulus lupulus)  +    15 Malt (hordeum vulgare)  +    16 Oat (federico sativa) -    17 Rice (oryza sativa) -    18 Rye (secale cereale) -    19 Whole wheat (triticum aestivum) +    20  White wheat flour - Patients own      Conclusion: There was a certain dermographism on the back with a few food allergens especially on the wheat panel, slightly positive. However, compared to the positive control, the reaction was very slight that I consider it as not relevant.     DRUG ALLERGY TEST SERIES     ANTIBIOTICS      Prick Tests         Substance/ Allergen Conc Result (20 min) Remarks    Histamine Hydrochloride (ALK) 0.1 mg/ml ++     NaCl 0.9% -    1 Cefazolin[1] 100 mg/ml -    2 Ceftriaxone* [2] 100 mg/ml -    3 Benzylpenicillin (Penicillin G) 1 [place] IU/ml (600 mg) -    4 Ampicillin 100mg/ml -    5 Meropenem (Carbapenem) 10 mg/ml -       Intradermal Tests   immed immed delay delay      Substance Conc 1st dil  2nd dil  days  days remarks   1 Cefazolin[1] 1:5 3 mm papule, no erythema -   Same dilution, repeat   2 Ceftriaxone* [2] 1:5 15 mm papule, 30 mm erythema       3 Benzylpenicillin (Penicillin G) 1:100 10 mm papule, 12 mm erythema       4 Ampicillin 1:10 7 mm papule, 10 erythema       5 Meropenem (Carbapenem) 1:10 7 mm papule, no erythema 4 mm papule with erythema   Same dilution, repeat   6 Ceftazidime 1:5 -       * Only with limited indications    *   Methoxyimino-group (crossreactive IgE)  [1]  Cefalexin/Cefazolin-group        [2]    Cefotaxim-group     [3]     Cefuroxim-group      Final diagnosis:    >> Anaphylactic reaction, likely due to a component of [employer] chicken yoyl pasta bowl    Previously prescribed emergency set of 2x50mg prednisone and 2x10mg cetirizine for allergic reactions. Instructions: If severe allergic reaction, immediately take two tablets of prednisone 50mg and 2 tablets of cetirizine 10mg and then write precise 12h retrospective diary. If less severe reaction, take only the 2 tablets of cetirizine 10mg.    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 However, if cephalosporin use would be vital, propose to use cefazolin and ceftazidime, as these reactions were negative in intradermal tests.    Treatment prescribed/Plan:  - Provided patient with emergency set containing  2 tablets prednisone 50 mg and 2 tablets cetirizine 10 mg. Patient should have emergency tablets with them at all times in keychain box. If symptoms of allergy recur, patient to take these medications to prevent reaction.      Follow up as needed.       [facility]  [address] [place] [address] on close of this encounter.      I spent a total of 44 minutes face to face with Venice Childers during today s office visit. Over 50% of this time was spent counseling the patient and/or coordinating care. Please see Assessment and Plan for details.      Scribe Disclosure  I, Liza Smith, am serving as a scribe to document services personally performed by Dr. Krishna Hines MD, based on data collection and the provider's  statements to me.

## 2019-11-27 NOTE — NURSING NOTE
Allergy Rooming Note    Venice Childers's goals for this visit include:   Chief Complaint   Patient presents with     Allergy Testing Followup     Lisa is here for allergy drug testing      Kacy Meneses LPN

## 2019-11-27 NOTE — PATIENT INSTRUCTIONS
DRUG ALLERGY TEST SERIES     ANTIBIOTICS      Prick Tests         Substance/ Allergen Conc Result (20 min) Remarks    Histamine Hydrochloride (ALK) 0.1 mg/ml ++     NaCl 0.9% -    1 Cefazolin[1] 100 mg/ml -    2 Ceftriaxone* [2] 100 mg/ml -    3 Benzylpenicillin (Penicillin G) 1 Sea IU/ml (600 mg) -    4 Ampicillin 100mg/ml -    5 Meropenem (Carbapenem) 10 mg/ml -       Intradermal Tests   immed immed delay delay      Substance Conc 1st dil  2nd dil  days  days remarks   1 Cefazolin[1] 1:5 3 mm papule, no erythema -   Same dilution, repeat   2 Ceftriaxone* [2] 1:5 15 mm papule, 30 mm erythema       3 Benzylpenicillin (Penicillin G) 1:100 10 mm papule, 12 mm erythema       4 Ampicillin 1:10 7 mm papule, 10 erythema       5 Meropenem (Carbapenem) 1:10 7 mm papule, no erythema 4 mm papule with erythema   Same dilution, repeat   6 Ceftazidime 1:5 -       * Only with limited indications    *  Methoxyimino-group (crossreactive IgE)  [1]  Cefalexin/Cefazolin-group        [2]    Cefotaxim-group     [3]     Cefuroxim-group      Final diagnosis:    >> Anaphylactic reaction, likely due to a component of [employer] chicken yoly pasta bowl    Previously prescribed emergency set of 2x50mg prednisone and 2x10mg cetirizine for allergic reactions. Instructions: If severe allergic reaction, immediately take two tablets of prednisone 50mg and 2 tablets of cetirizine 10mg and then write precise 12h retrospective diary. If less severe reaction, take only the 2 tablets of cetirizine 10mg.    Recommended to continue avoiding the pasta bowls and yoly-containing items from "[employer]    >> Severe immediate-type sensitivity with generalized urticaria to penicillin G, aminopenicilins second generation cephalosporins (ceftriaxone), piperacillin/meropenems    Based on immediate reactions to ceftriaxone and penicillins, would propose patient AVOID all penicillins, cephalosporins, or piperacillins/meropenem for safety.     However, if  cephalosporin use would be vital, we propose to use cefazolin/cephalexin or cefuroxim/ceftazidime, as these reactions were negatives in intradermal tests. In this case, should be given initial dose step-wise (10% of full dose, wait 30 minutes; then 20% of full dose, wait 30 minutes; then 30% of full dose, wait 30 minutes; then 40% of full dose, wait 30 minutes). Patient should be supervised for entirety. If no reaction 30 minutes after 40% of full dose, next dose can be given as full dose.    Treatment prescribed/Plan:  - Provided patient with emergency set containing  2 tablets prednisone 50 mg and 2 tablets cetirizine 10 mg. Patient should have emergency tablets with them at all times in keychain box. If symptoms of allergy recur, patient to take these medications to prevent reaction.

## 2019-11-27 NOTE — PROGRESS NOTES
Patient had 8 prick tests placed this visit.    Control Tests (2 tests)    Medications ( 6 tests)    Patient had 6 intradermal tests placed this visit.    Medications ( 6 tests)        1) Drug Administration Record  Prior to injection, verified patient identity using patient's name and date of birth.  Due to injection administration, patient instructed to remain in clinic for 15 minutes  afterwards, and to report any adverse reaction to me immediately.  Drug Name: Cefazolin 500 mg/ vial  Dose: 1.5ml  Route administered: Patch, prick, ID  NDC #: 41388-017-76  Amount of waste(mL):3.5ml  Reason for waste: Single use vial  LOT #: [id number]  SITE: Arms  : [facility]  EXPIRATION DATE: 1/2021    2) Drug Administration Record  Prior to injection, verified patient identity using patient's name and date of birth.  Due to injection administration, patient instructed to remain in clinic for 15 minutes  afterwards, and to report any adverse reaction to me immediately.  Drug Name: Ceftriaxone 250 mg / ml  Dose: 1.5ml  Route administered: Patch, prick, ID  NDC #: 7908-0006-30  Amount of waste(mL):1ml  Reason for waste: Single use vial  LOT #: [id number]  SITE: Arms  : [employer]  EXPIRATION DATE: 2/2021    3) Drug Administration Record  Prior to injection, verified patient identity using patient's name and date of birth.  Due to injection administration, patient instructed to remain in clinic for 15 minutes  afterwards, and to report any adverse reaction to me immediately.  Drug Name: Penicillin G  Dose: 1.5ml  Route administered: Patch, prick, ID  NDC #: 4838-4974-41  Amount of waste(mL):3.5ml  Reason for waste: Single use vial  LOT #: [id number]  SITE: Arms  : [facility]   EXPIRATION DATE: 12/2021     4) Drug Administration Record  Prior to injection, verified patient identity using patient's name and date of birth.  Due to injection administration, patient instructed to remain in clinic for 15  minutes  afterwards, and to report any adverse reaction to me immediately.  Drug Name: Ampicillin 1 gram  Dose: 1.5ml  Route administered: Patch, prick, ID  NDC #: 3253-5334-95  Amount of waste(mL):8.5ml  Reason for waste: Single use vial  LOT #: [id number]  SITE: Arms  : [facility]  EXPIRATION DATE: 2/2021    5) Drug Administration Record  Prior to injection, verified patient identity using patient's name and date of birth.  Due to injection administration, patient instructed to remain in clinic for 15 minutes  afterwards, and to report any adverse reaction to me immediately.  Drug Name: Meropenem 500 mg  Dose: 1.5ml  Route administered: Patch, prick, ID  NDC #: 45566-572-99  Amount of waste(mL):3.5ml  Reason for waste: Single use vial  LOT #: [id number]  SITE: Arms  : [employer]  EXPIRATION DATE: 12/2021    6) Drug Administration Record  Prior to injection, verified patient identity using patient's name and date of birth.  Due to injection administration, patient instructed to remain in clinic for 15 minutes  afterwards, and to report any adverse reaction to me immediately.  Drug Name: Ceftazidime 1g  Dose: 1.5ml  Route administered: Patch, prick, ID  NDC #: 77926-120-37  Amount of waste(mL):8.5ml  Reason for waste: Single use vial  LOT #: [id number]  SITE: Arms  : [facility]  EXPIRATION DATE: 1/2020

## 2019-11-27 NOTE — PROGRESS NOTES
[facility] Health Allergy Note    Allergy Clinic  [facility]  [address] [place] [id number]    Allergy Problem List:  1. Anaphylactic reaction to [employer] chicken yoly pasta bowl  -Was prescribed emergency set  2. 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 Venice Childers is a 44 year old female who presents as a referral from Dr. Anni Mathias for a second opinion for an anaphylactic reaction she had on 04/23/2019. That morning, the patient was at home. 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 She recalls having a peanut butter sandwich for breakfast and stumbled after putting out the trash due to nerve damage in a leg. She then went to sleep and woke up to have dinner which consisted only of a diet coke and a chicken yoly pasta bowl from [employer] which she was getting for the first time. She has previously had food from [employer] that contains yoly. She consumed a third of the meal and developed pruritus over her whole body 10 minutes later. She then went to go take a shower and noticed her throat starting to close. She called 911 and was given epinephrine by the paramedics which stopped the throat closing. Her blood pressure was 67/43, normally it is around 140/80. On the way to the hospital, she was continued on epinephrine and started on diphenhydramine, methylprednisolone, and D5 1/2 NS. The itching subsided  when she got to the hospital. After several hours of monitoring to ensure no reaction developing, she went home. She later contacted the [employer] to see if any of the workers that night were on antibiotics as she is allergic to penicillins. The manager later checked and reported that no one was taking antibiotics. The patient has been avoiding "[employer]s pasta bowl and yoly-containing food items since the reaction. She has ate from that [employer] store since the reaction.    On 05/21/2019, she went to go see an allergist in [place]. Blood tests were conducted to determine if she had any allergies to bees as well as the main ingredients of the meal without any positive results. She was advised to prescribed an epi pen. The patient had a prick test when she was 21-years-old with dust mites, birch trees, and a mold having positive results. Also when she was that age, she developed hives and eyes swelling at 2AM in [place], [place]. The ER doctors there informed her that she may be allergic to MSG as she had Chinese food the night before. The patient denies having hay fever and asthma. She reports that she does have Hashimoto's thyroiditis but no other autoimmune disease.    Hx since 11/8/19:  Pt comes in today, 11/22/19, for a repeat of the atopy screen prick testing. Last time in this clinic she had undergone atopy screen prick testing but was found to be on antihistamines. She was given an emergency set before rescheduling. Patient's Hx indicates that she has a severe immediate type reaction to penicillins/minor penicillins. There were incidents of droplet exposure with amoxicillin on the hand during preparation of amoxicillin injection, and she developed in 30 minutes generalized urticaria. In addition, she has observed that her  was feeding the dog amoxicillin and touched things in the kitchen.     History as of 11/27/19:    Patient returns to clinic today for antibiotic prick and intradermal  testing. She reports she had ceftriaxone during a surgery about a year ago without reaction. No additional concerns discussed.        Past Medical History:   There is no problem list on file for this patient.    No past medical history on file.  No past surgical history on file.    Social History:  Patient works as a nurse.    Family History:  No family history on file.    Medications:  Current Outpatient Medications   Medication Sig Dispense Refill     cetirizine (ZYRTEC) 10 MG tablet Take 1 tablet (10 mg) by mouth as needed for allergies (emergency set) 2 tablet 3     predniSONE (DELTASONE) 50 MG tablet Take 2 tablets (100 mg) by mouth daily 2 tablet 3       Allergies   Allergen Reactions     Other  [No Clinical Screening - See Comments] Other (See Comments) and Shortness Of Breath     GRASS /DUST/  BIRCH TREES     Adhesive Tape Rash     Paper tape       Birch Trees Headache     Cipro Hc Hives     Ciprofloxacin Hives     Dust Mites      Erythromycin Hives     Sulfa Drugs Hives     Penicillins Hives, Itching and Rash       Review of Systems:  -As per HPI  -Constitutional: Otherwise feeling well today, in usual state of health.  -HEENT: Patient denies nonhealing oral sores.  -Skin: As above in HPI. No additional skin concerns.    Physical exam:  Vitals: There were no vitals taken for this visit.  GEN: This is a well developed, well-nourished female in no acute distress, in a pleasant mood.    SKIN: Not performed.    Atopy Screen (Placed 11/09/2019)    No Substance Readings (15 min) Evaluation   POS Histamine 1mg/ml -    NEG NaCl 0.9% -      No Substance Readings (15 min) Evaluation   1 Alternaria alternata (tenuis)  -    2 Cladosporium herbarum -    3 Aspergillus fumigatus -    4 Penicillium notatum -    5 Dermatophagoides pteronyssinus -    6 Dermatophagoides farinae -    7 Dog epithelium (canis spp) -    8 Cat hair (addison catus) -    9 Cockroach   (Blatella americana & germanica) -    10 Grass mix [facility]   (June,  Orchard, Redtop, Jefferson) -    11 Alvarado grass (sorghum halepense) -    12 Weed mix   (common Cocklebur, Lamb s quarters, rough redroot Pigweed, Dock/Sorrel) -    13 Mug wort (artemisia vulgare) -    14 Ragweed giant/short (ambrosia spp) -    15 English Plantain (plantago lanceolata) -    16 Tree mix 1 (Pecan, Maple BHR, Oak RVW, american [place], black [place]) -    17 Red cedar (juniperus virginia) -    18 Tree mix 2   (white Iam, river/red Birch, black [place], common [place], american Elm) -    19 Box elder/Maple mix (acer spp) -    20 Barton shagbark (carya ovata) -    F  White flour -      Conclusion: None    Patient had 24 prick tests placed this visit.    Control Tests (2 tests)    Standard Atopic Panel (21 tests)    Personal Products ( 1 tests)    Atopy Screen (Placed 11/22/19 )    No Substance Readings (15 min) Evaluation   POS Histamine 1mg/ml ++    NEG NaCl 0.9% -      No Substance Readings (15 min) Evaluation   1 Alternaria alternata (tenuis)  -    2 Cladosporium herbarum -    3 Aspergillus fumigatus -    4 Penicillium notatum -    5 Dermatophagoides pteronyssinus -    6 Dermatophagoides farinae -    7 Dog epithelium (canis spp) -    8 Cat hair (addison catus) -    9 Cockroach   (Blatella americana & germanica) -    10 Grass mix [place]   (June, Orchard, Redtop, [place]) -    11 Alvarado grass (sorghum halepense) -    12 Weed mix   (common Cocklebur, Lamb s quarters, rough redroot Pigweed, Dock/Sorrel) -    13 Mug wort (artemisia vulgare) -    14 Ragweed giant/short (ambrosia spp) -    15 English Plantain (plantago lanceolata) -    16 Tree mix 1 (Pecan, Maple BHR, Oak RVW, american [place], black [place]) -    17 Red cedar (juniperus virginia) -    18 Tree mix 2   (white Iam, river/red Birch, black [place], common [place], american Elm) -    19 Box elder/Maple mix (acer spp) -    20 Barton shagbark (carya ovata) -    21 Shannon Holder Bread Bowl     22 White flour  -      Conclusion: Negative atopy  screen prick test. No signs for atopic predisposition.       Milk, Meat, Egg, and Grains (11/22/19 )    No Substance Readings (15min) Evaluation   POS Histamine 1mg/ml ++    NEG NaCl 0.9% -      No Substance Readings (15min) Evaluation   1 Cows milk whole (karina paras) -    2 Casein (karina paras) -    3 Beef (karina paras) -    4 Goat milk -    5 Lamb (ovis wild) -    6 Egg white (gallus gallus) -    7 Egg yolk (gallus gallus) -    8 Chicken (gallus gallus) -    9 Turkey (meleagris gallopavo) -    10 Pork (chema scrofa) -    11 Barley (hordeum vulgare) +    12 Buckwheat (fagopyrum esculentum) -    13 Corn (michael mais) -    14 Hops (humulus lupulus)  +    15 Malt (hordeum vulgare)  +    16 Oat (federico sativa) -    17 Rice (oryza sativa) -    18 Rye (secale cereale) -    19 Whole wheat (triticum aestivum) +    20  White wheat flour - Patients own      Conclusion: There was a certain dermographism on the back with a few food allergens especially on the wheat panel, slightly positive. However, compared to the positive control, the reaction was very slight that I consider it as not relevant.     DRUG ALLERGY TEST SERIES     ANTIBIOTICS      Prick Tests         Substance/ Allergen Conc Result (20 min) Remarks    Histamine Hydrochloride (ALK) 0.1 mg/ml ++     NaCl 0.9% -    1 Cefazolin[1] 100 mg/ml -    2 Ceftriaxone* [2] 100 mg/ml -    3 Benzylpenicillin (Penicillin G) 1 Mar Lin IU/ml (600 mg) -    4 Ampicillin 100mg/ml -    5 Meropenem (Carbapenem) 10 mg/ml -    6 Ceftazidime 100mg/ml -       Intradermal Tests   immed immed delay delay      Substance Conc 1st dil  2nd dil  days  days remarks   1 Cefazolin[1] 1:5 3 mm p, no E -   Same dilution, repeat = negative   2 Ceftriaxone* [2] 1:5 15 mm P, 30 mm E    +++   3 Benzylpenicillin (Penicillin G) 1:100 10 mm P, 12 mm E    ++   4 Ampicillin 1:10 7 mm P, 10 E    ++   5 Meropenem (Carbapenem) 1:10 7 mm P, no E 4 mm P with E   Same dilution, repeat = +   6 Ceftazidime 1:5 -    neg     *   Methoxyimino-group (crossreactive IgE)  [1]  Cefalexin/Cefazolin-group        [2]    Cefotaxim-group     [3]     Cefuroxim-group      Final diagnosis:    >> Anaphylactic reaction, likely due to a component of [employer]'s chicken yoly pasta bowl    Previously prescribed emergency set of 2x50mg prednisone and 2x10mg cetirizine for allergic reactions. Instructions: If severe allergic reaction, immediately take two tablets of prednisone 50mg and 2 tablets of cetirizine 10mg and then write precise 12h retrospective diary. If less severe reaction, take only the 2 tablets of cetirizine 10mg.    Recommended to continue avoiding the pasta bowls and yoly-containing items from [employer]'s    >> Immediate-type sensitivity with generalized urticaria to penicillin G, aminopenicilins second generation cephalosporins (ceftriaxone), piperacillin/meropenems    Based on immediate reactions to ceftriaxone and penicillins, would propose patient AVOID all penicillins, cephalosporins, or piperacillins/meropenem for safety.     However, if cephalosporin use would be vital, we propose to use cefazolin/cephalexin or cefuroxim/ceftazidime, as these reactions were negatives in intradermal tests. In this case, should be given initial dose step-wise (10% of full dose, wait 30 minutes; then 20% of full dose, wait 30 minutes; then 30% of full dose, wait 30 minutes; then 40% of full dose, wait 30 minutes). Patient should be supervised for entirety. If no reaction 30 minutes after 40% of full dose, next dose can be given as full dose.    Treatment prescribed/Plan:  - Provided patient with emergency set containing  2 tablets prednisone 50 mg and 2 tablets cetirizine 10 mg. Patient should have emergency tablets with them at all times in keychain box. If symptoms of allergy recur, patient to take these medications to prevent reaction.      Follow up as needed.       CC Anni Mathias-[facility]  [address] [place] [address] on close of this encounter.      I spent a total of 44 minutes face to face with Venice Childers during today s office visit. Over 50% of this time was spent counseling the patient and/or coordinating care. Please see Assessment and Plan for details.      Scribe Disclosure  I, Liza Sarah, am serving as a scribe to document services personally performed by Dr. Krishna Hines MD, based on data collection and the provider's statements to me.    I spent a total of 38 minutes face to face with Venice Childers during today s office visit. Over 50% of this time was spent counseling the patient and/or coordinating care.  Please see Assessment and Plan for details.  This excludes any time spent performing prick and intradermal tests

## 2019-11-30 ENCOUNTER — MYC MEDICAL ADVICE (OUTPATIENT)
Dept: ALLERGY | Facility: CLINIC | Age: 44
End: 2019-11-30

## 2019-12-04 ENCOUNTER — MYC MEDICAL ADVICE (OUTPATIENT)
Dept: ALLERGY | Facility: CLINIC | Age: 44
End: 2019-12-04

## 2019-12-17 ENCOUNTER — OFFICE VISIT (OUTPATIENT)
Dept: ALLERGY | Facility: CLINIC | Age: 44
End: 2019-12-17
Payer: COMMERCIAL

## 2019-12-17 DIAGNOSIS — T78.2XXA ANAPHYLAXIS, INITIAL ENCOUNTER: ICD-10-CM

## 2019-12-17 DIAGNOSIS — Z88.9 DRUG ALLERGY: Primary | ICD-10-CM

## 2019-12-17 DIAGNOSIS — L50.0 ALLERGIC URTICARIA: ICD-10-CM

## 2019-12-17 ASSESSMENT — PAIN SCALES - GENERAL: PAINLEVEL: NO PAIN (0)

## 2019-12-17 NOTE — LETTER
Dear ,    Thank you for referring your patient, Venice Childers, for allergy testing. This patient was seen for allergy testing. The below compounds were tested. Please see below for my interpretation and a list of tests preformed.       Final Diagnosis  >> Anaphylactic reaction, likely due to a component of [employer] chicken yoly pasta bowl    Previously prescribed emergency set of 2x50mg prednisone and 2x10mg cetirizine for allergic reactions. Instructions: If severe allergic reaction, immediately take two tablets of prednisone 50mg and 2 tablets of cetirizine 10mg and then write precise 12h retrospective diary. If less severe reaction, take only the 2 tablets of cetirizine 10mg.    Recommended to continue avoiding the pasta bowls and yoly-containing items from [employer]  >> Severe immediate-type sensitivity to penicillins, cephalosporins, piperacillin/meropanems    Based on immediate reactions to ceftriaxone and penicillins, would propose patient avoids all penicillins, cephalosporins, or pipercillins/meropanem for safety. However, if cephalosporin use would be vital, propose to use cefazolin and ceftazidime, as these reactions were negative in intradermal tests.       Treatment/Plan  - Provided patient with emergency set containing  2 tablets prednisone 50 mg and 2 tablets cetirizine 10 mg. Patient should have emergency tablets with them at all times in keychain box. If symptoms of allergy recur, patient to take these medications to prevent reaction.     - Reviewed patient's allergy test results and discussed antibiotic options for her upcoming surgery. If patient's physicians decide that a 3rd generation cephalosporin is needed for the procedure, recommend ceftazidim can be given slowly increasing dose (10% dose then wait 30 minutes, then 20% dose then 30 minutes, then 30% dose then 30 minutes, then 40% dose then 30 minutes. If no reaction after 40% of a dose, can proceed with  full dose).     - Discussed that non-penicillin antibiotics (gentamycin and neomycin) are usually the antibiotics used in flu vaccinations and that these do not cross react with the patient's allergies. Discussed patient receiving flu shot in clinic if she is still anxious about receiving the vaccination.    Tested Products  (Atopy Test) Alternaria alternata (tenuis) ,Cladosporium herbarum, Aspergillus fumigatus, Penicillium notatum, Dermatophagoides pteronyssinus, Dermatophagoides farinae, Dog epithelium (canis spp), Cat hair (addison catus), Cockroach (Blatella americana & germanica), Grass mix midwest ([place], Orchard, Redtop, Jefferson), Alvarado grass (sorghum halepense), Dock/Sorrel mix (rumex spp), Mugwort (artemisia vulgare), Ragweed giant/short (ambrosia spp), Lambs Quarter (chenopodium album), English Plantain (plantago lanceolata), [place] (populus deltoides), [place] mix (querqus spp), Birch mix (betula spp), Iam red/green/white (fraxinus spp), Box elder/Maple mix (acer spp), [place] shagbark (carya ovata), Red cedar (juniperus virginia), American Elm (ulmus Americana) (Milk, Meat, Egg, and Grains) Cows milk whole (karina paras), Casein (karina paras), Beef (karina paras), Goat milk, Lamb (ovis wild), Egg white (gallus gallus), Egg yolk (gallus gallus), Chicken (gallus gallus), Turkey (meleagris gallopavo), Pork (chema scrofa), Barley (hordeum vulgare), Buckwheat (fagopyrum esculentum), Corn (michael mais), Hops (humulus lupulus) ??, Malt (hordeum vulgare) ??, Oat (federico sativa), Rice (oryza sativa), Rye (secale cereale), Whole wheat (triticum aestivum), White wheat flour      These conclusions are made at the best of ones knowledge and belief  based on the provided evidence such as patients history and allergy test results and they can change over time or can be incomplete because of missing informations.       If you have any questions please call [phone]      Sincerely,    Krishna Hines MD

## 2019-12-17 NOTE — PROGRESS NOTES
[facility] Allergy Note    Allergy Clinic  [facility]ly [facility]  [address] [place] [id number]    Allergy Problem List:  1. Anaphylactic reaction to [employer] chicken yoly pasta bowl  -Was prescribed emergency set  2. 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        History of Present Illness:  Ms. Venice Childers is a 44 year old female who presents as a referral from Dr. Anni Mathias for a second opinion for an anaphylactic reaction she had on 04/23/2019. That morning, the patient was at home. 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 She recalls having a peanut butter sandwich for breakfast and stumbled after putting out the trash due to nerve damage in a leg. She then went to sleep and woke up to have dinner which consisted only of a diet coke and a chicken yoyl pasta bowl from [employer] which she was getting for the first time. She has previously had food from [employer] that contains yoly. She consumed a third of the meal and developed pruritus over her whole body 10 minutes later. She then went to go take a shower and noticed her throat starting to close. She called 911 and was given epinephrine by the paramedics which stopped the throat closing. Her blood pressure was 67/43, normally it is around 140/80. On the way to the hospital, she was continued on epinephrine and started on diphenhydramine, methylprednisolone, and D5 1/2 NS. The itching subsided  when she got to the hospital. After several hours of monitoring to ensure no reaction developing, she went home. She later contacted the [employer] to see if any of the workers that night were on antibiotics as she is allergic to penicillins. The manager later checked and reported that no one was taking antibiotics. The patient has been avoiding [employer] pasta bowl and yoly-containing food items since the reaction. She has ate from that [employer] store since the reaction.    On 05/21/2019, she went to go see an allergist in [place]. Blood tests were conducted to determine if she had any allergies to bees as well as the main ingredients of the meal without any positive results. She was advised to prescribed an epi pen. The patient had a prick test when she was 21-years-old with dust mites, birch trees, and a mold having positive results. Also when she was that age, she developed hives and eyes swelling at 2AM in [place], [place]. The ER doctors there informed her that she may be allergic to MSG as she had Chinese food the night before. The patient denies having hay fever and asthma. She reports that she does have Hashimoto's thyroiditis but no other autoimmune disease.    Hx since 11/8/19:  Pt comes in today, 11/22/19, for a repeat of the atopy screen prick testing. Last time in this clinic she had undergone atopy screen prick testing but was found to be on antihistamines. She was given an emergency set before rescheduling. Patient's Hx indicates that she has a severe immediate type reaction to penicillins/minor penicillins. There were incidents of droplet exposure with amoxicillin on the hand during preparation of amoxicillin injection, and she developed in 30 minutes generalized urticaria. In addition, she has observed that her  was feeding the dog amoxicillin and touched things in the kitchen.     History as of 11/27/19:    Patient returns to clinic today for antibiotic prick and intradermal  testing. She reports she had ceftriaxone during a surgery about a year ago without reaction. No additional concerns discussed.    History as of 12/17/19:    Patient returns to clinic today for a follow up. She reports she has an upcoming bunion surgery and is concerned about which antibiotics she can be given. She would also like to discuss immunizations in the future No additional concerns discussed.      Past Medical History:   There is no problem list on file for this patient.    No past medical history on file.  No past surgical history on file.    Social History:  Patient works as a nurse.    Family History:  No family history on file.    Medications:  Current Outpatient Medications   Medication Sig Dispense Refill     cefTAZidime (FORTAZ) 1 GM vial For Allergy Testing in Allergy Clinic Only 1 each 0     cetirizine (ZYRTEC) 10 MG tablet Take 1 tablet (10 mg) by mouth as needed for allergies (emergency set) 2 tablet 3     predniSONE (DELTASONE) 50 MG tablet Take 2 tablets (100 mg) by mouth daily 2 tablet 3       Allergies   Allergen Reactions     Penicillins Hives, Itching and Rash     Immediate type reaction to Penicillins with gen Urticaria  In skin tests (Intradermal) clearly positive after 20min to Penicillin G, Ampicillin, Ceftriaxone and slightly to Meropenem.  AVOID in future all Penicillins and Cephalosporines (incl Meropenem)  If REALLY necessary Cefazolin and/or Ceftazidim can be given (intradermal test negative)     Adhesive Tape Rash     Paper tape       Ciprofloxacin Hives     Erythromycin Hives     Sulfa Drugs Hives       Review of Systems:  -As per HPI  -Constitutional: Otherwise feeling well today, in usual state of health.  -HEENT: Patient denies nonhealing oral sores.  -Skin: As above in HPI. No additional skin concerns.    Physical exam:  Vitals: There were no vitals taken for this visit.  GEN: This is a well developed, well-nourished female in no acute distress, in a pleasant mood.    SKIN:  Not performed.    Atopy Screen (Placed 11/09/2019)    No Substance Readings (15 min) Evaluation   POS Histamine 1mg/ml -    NEG NaCl 0.9% -      No Substance Readings (15 min) Evaluation   1 Alternaria alternata (tenuis)  -    2 Cladosporium herbarum -    3 Aspergillus fumigatus -    4 Penicillium notatum -    5 Dermatophagoides pteronyssinus -    6 Dermatophagoides farinae -    7 Dog epithelium (canis spp) -    8 Cat hair (addison catus) -    9 Cockroach   (Blatella americana & germanica) -    10 Grass mix midwest   (Deidre, Orchard, Redtop, Ejfferson) -    11 Alvarado grass (sorghum halepense) -    12 Weed mix   (common Cocklebur, Lamb s quarters, rough redroot Pigweed, Dock/Sorrel) -    13 Mug wort (artemisia vulgare) -    14 Ragweed giant/short (ambrosia spp) -    15 English Plantain (plantago lanceolata) -    16 Tree mix 1 (Pecan, Maple BHR, Oak RVW, american [place], black Kincaid) -    17 Red cedar (juniperus virginia) -    18 Tree mix 2   (white Iam, river/red Birch, black [place], common Lamb, american Elm) -    19 Box elder/Maple mix (acer spp) -    20 Wright shagbark (carya ovata) -    F  White flour -      Conclusion: None    Patient had 24 prick tests placed this visit.    Control Tests (2 tests)    Standard Atopic Panel (21 tests)    Personal Products ( 1 tests)    Atopy Screen (Placed 11/22/19 )    No Substance Readings (15 min) Evaluation   POS Histamine 1mg/ml ++    NEG NaCl 0.9% -      No Substance Readings (15 min) Evaluation   1 Alternaria alternata (tenuis)  -    2 Cladosporium herbarum -    3 Aspergillus fumigatus -    4 Penicillium notatum -    5 Dermatophagoides pteronyssinus -    6 Dermatophagoides farinae -    7 Dog epithelium (canis spp) -    8 Cat hair (addison catus) -    9 Cockroach   (Blatella americana & germanica) -    10 Grass mix midwest   (Deidre, Orchard, Redtop, [place]) -    11 Alvarado grass (sorghum halepense) -    12 Weed mix   (common Cocklebur, Lamb s quarters, rough redroot  Pigweed, Dock/Sorrel) -    13 Mug wort (artemisia vulgare) -    14 Ragweed giant/short (ambrosia spp) -    15 English Plantain (plantago lanceolata) -    16 Tree mix 1 (Pecan, Maple BHR, Oak RVW, american [place], black [place]) -    17 Red cedar (juniperus virginia) -    18 Tree mix 2   (white Iam, river/red Birch, black [place], common [place], american Elm) -    19 Box elder/Maple mix (acer spp) -    20 [place]am shagbark (carya ovata) -    21 DomMasquemedicos Gallo Bread Bowl     22 White flour  -      Conclusion: Negative atopy screen prick test. No signs for atopic predisposition.       Milk, Meat, Egg, and Grains (11/22/19 )    No Substance Readings (15min) Evaluation   POS Histamine 1mg/ml ++    NEG NaCl 0.9% -      No Substance Readings (15min) Evaluation   1 Cows milk whole (karina paras) -    2 Casein (karina paras) -    3 Beef (karina paras) -    4 Goat milk -    5 Lamb (ovis wild) -    6 Egg white (gallus gallus) -    7 Egg yolk (gallus gallus) -    8 Chicken (gallus gallus) -    9 Turkey (meleagris gallopavo) -    10 Pork (chema scrofa) -    11 Barley (hordeum vulgare) +    12 Buckwheat (fagopyrum esculentum) -    13 Corn (michael mais) -    14 Hops (humulus lupulus)  +    15 Malt (hordeum vulgare)  +    16 Oat (federico sativa) -    17 Rice (oryza sativa) -    18 Rye (secale cereale) -    19 Whole wheat (triticum aestivum) +    20  White wheat flour - Patients own      Conclusion: There was a certain dermographism on the back with a few food allergens especially on the wheat panel, slightly positive. However, compared to the positive control, the reaction was very slight that I consider it as not relevant.     DRUG ALLERGY TEST SERIES     ANTIBIOTICS      Prick Tests         Substance/ Allergen Conc Result (20 min) Remarks    Histamine Hydrochloride (ALK) 0.1 mg/ml ++     NaCl 0.9% -    1 Cefazolin[1] 100 mg/ml -    2 Ceftriaxone* [2] 100 mg/ml -    3 Benzylpenicillin (Penicillin G) 1 [place] IU/ml (600 mg) -    4 Ampicillin  100mg/ml -    5 Meropenem (Carbapenem) 10 mg/ml -       Intradermal Tests   immed immed delay delay      Substance Conc 1st dil  2nd dil  days  days remarks   1 Cefazolin[1] 1:5 3 mm papule, no erythema -   Same dilution, repeat   2 Ceftriaxone* [2] 1:5 15 mm papule, 30 mm erythema       3 Benzylpenicillin (Penicillin G) 1:100 10 mm papule, 12 mm erythema       4 Ampicillin 1:10 7 mm papule, 10 erythema       5 Meropenem (Carbapenem) 1:10 7 mm papule, no erythema 4 mm papule with erythema   Same dilution, repeat   6 Ceftazidime 1:5 -       * Only with limited indications    *  Methoxyimino-group (crossreactive IgE)  [1]  Cefalexin/Cefazolin-group        [2]    Cefotaxim-group     [3]     Cefuroxim-group      Final diagnosis:    >> Anaphylactic reaction, likely due to a component of [employer] chicken yoly pasta bowl    Previously prescribed emergency set of 2x50mg prednisone and 2x10mg cetirizine for allergic reactions. Instructions: If severe allergic reaction, immediately take two tablets of prednisone 50mg and 2 tablets of cetirizine 10mg and then write precise 12h retrospective diary. If less severe reaction, take only the 2 tablets of cetirizine 10mg.    Recommended to continue avoiding the pasta bowls and yoly-containing items from [employer]    >> Severe immediate-type sensitivity to penicillins, cephalosporins, piperacillin/meropanems    Based on immediate reactions to ceftriaxone and penicillins, would propose patient avoids all penicillins, cephalosporins, or pipercillins/meropanem for safety. However, if cephalosporin use would be vital, propose to use cefazolin and ceftazidime, as these reactions were negative in intradermal tests.    Treatment prescribed/Plan:  - Provided patient with emergency set containing  2 tablets prednisone 50 mg and 2 tablets cetirizine 10 mg. Patient should have emergency tablets with them at all times in keychain box. If symptoms of allergy recur, patient to take these  medications to prevent reaction.    - Reviewed patient's allergy test results and discussed antibiotic options for her upcoming surgery. If patient's physicians decide that a 3rd generation cephalosporin is needed for the procedure, recommend ceftazidim can be given slowly increasing dose (10% dose then wait 30 minutes, then 20% dose then 30 minutes, then 30% dose then 30 minutes, then 40% dose then 30 minutes. If no reaction after 40% of a dose, can proceed with full dose).    - Discussed that non-penicillin antibiotics (gentamycin and neomycin) are usually the antibiotics used in flu vaccinations and that these do not cross react with the patient's allergies. Discussed patient receiving flu shot in clinic if she is still anxious about receiving the vaccination.      Follow up as needed.     Scribe Disclosure  I, Liza Smith, am serving as a scribe to document services personally performed by Dr. Krishna Hines MD, based on data collection and the provider's statements to me.    I spent a total of 15 minutes face to face with Venice Childers during today s office visit. Over 50% of this time was spent counseling the patient and/or coordinating care. Please see Assessment and Plan for details.    CC [facility] [place]  [address] [address] on close of this encounter.

## 2019-12-17 NOTE — LETTER
12/17/2019         RE: Venice Childers  [address] [facility] [address]        Dear Colleague,    Thank you for referring your patient, Venice Childers, to the [facility] ALLERGY. Please see a copy of my visit note below.    [facility] Allergy Note    Allergy Clinic  [facility]  [address] [place] [id number]    Allergy Problem List:  1. Anaphylactic reaction to [employer] chicken yoly pasta bowl  -Was prescribed emergency set  2. 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        History of Present Illness:  Ms. Venice Childers is a 44 year old female who presents as a referral from Dr. Anni Mathias for a second opinion for an anaphylactic reaction she had on 04/23/2019. That morning, the patient was at home. 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 She recalls having a peanut butter sandwich for breakfast and stumbled after putting out the trash due to nerve damage in a leg. She then went to sleep and woke up to have dinner which consisted only of a diet coke and a chicken yoly pasta bowl from [employer] which she was getting for the first time. She has previously had food from [employer] that contains yoly. She consumed a third of the meal and developed pruritus over her whole body 10 minutes later. She then went to go take a shower and noticed her throat starting to close. She called 911 and was given epinephrine by the paramedics  which stopped the throat closing. Her blood pressure was 67/43, normally it is around 140/80. On the way to the hospital, she was continued on epinephrine and started on diphenhydramine, methylprednisolone, and D5 1/2 NS. The itching subsided when she got to the hospital. After several hours of monitoring to ensure no reaction developing, she went home. She later contacted the [employer] to see if any of the workers that night were on antibiotics as she is allergic to penicillins. The manager later checked and reported that no one was taking antibiotics. The patient has been avoiding [employer] pasta bowl and yoly-containing food items since the reaction. She has ate from that [employer] store since the reaction.    On 05/21/2019, she went to go see an allergist in [place]. Blood tests were conducted to determine if she had any allergies to bees as well as the main ingredients of the meal without any positive results. She was advised to prescribed an epi pen. The patient had a prick test when she was 21-years-old with dust mites, birch trees, and a mold having positive results. Also when she was that age, she developed hives and eyes swelling at 2AM in [place], [place]. The ER doctors there informed her that she may be allergic to MSG as she had Chinese food the night before. The patient denies having hay fever and asthma. She reports that she does have Hashimoto's thyroiditis but no other autoimmune disease.    Hx since 11/8/19:  Pt comes in today, 11/22/19, for a repeat of the atopy screen prick testing. Last time in this clinic she had undergone atopy screen prick testing but was found to be on antihistamines. She was given an emergency set before rescheduling. Patient's Hx indicates that she has a severe immediate type reaction to penicillins/minor penicillins. There were incidents of droplet exposure with amoxicillin on the hand during preparation of amoxicillin injection, and she developed in 30  minutes generalized urticaria. In addition, she has observed that her  was feeding the dog amoxicillin and touched things in the kitchen.     History as of 11/27/19:    Patient returns to clinic today for antibiotic prick and intradermal testing. She reports she had ceftriaxone during a surgery about a year ago without reaction. No additional concerns discussed.    History as of 12/17/19:    Patient returns to clinic today for a follow up. She reports she has an upcoming bunion surgery and is concerned about which antibiotics she can be given. She would also like to discuss immunizations in the future No additional concerns discussed.      Past Medical History:   There is no problem list on file for this patient.    No past medical history on file.  No past surgical history on file.    Social History:  Patient works as a nurse.    Family History:  No family history on file.    Medications:  Current Outpatient Medications   Medication Sig Dispense Refill     cefTAZidime (FORTAZ) 1 GM vial For Allergy Testing in Allergy Clinic Only 1 each 0     cetirizine (ZYRTEC) 10 MG tablet Take 1 tablet (10 mg) by mouth as needed for allergies (emergency set) 2 tablet 3     predniSONE (DELTASONE) 50 MG tablet Take 2 tablets (100 mg) by mouth daily 2 tablet 3       Allergies   Allergen Reactions     Penicillins Hives, Itching and Rash     Immediate type reaction to Penicillins with gen Urticaria  In skin tests (Intradermal) clearly positive after 20min to Penicillin G, Ampicillin, Ceftriaxone and slightly to Meropenem.  AVOID in future all Penicillins and Cephalosporines (incl Meropenem)  If REALLY necessary Cefazolin and/or Ceftazidim can be given (intradermal test negative)     Adhesive Tape Rash     Paper tape       Ciprofloxacin Hives     Erythromycin Hives     Sulfa Drugs Hives       Review of Systems:  -As per HPI  -Constitutional: Otherwise feeling well today, in usual state of health.  -HEENT: Patient denies  nonhealing oral sores.  -Skin: As above in HPI. No additional skin concerns.    Physical exam:  Vitals: There were no vitals taken for this visit.  GEN: This is a well developed, well-nourished female in no acute distress, in a pleasant mood.    SKIN: Not performed.    Atopy Screen (Placed 11/09/2019)    No Substance Readings (15 min) Evaluation   POS Histamine 1mg/ml -    NEG NaCl 0.9% -      No Substance Readings (15 min) Evaluation   1 Alternaria alternata (tenuis)  -    2 Cladosporium herbarum -    3 Aspergillus fumigatus -    4 Penicillium notatum -    5 Dermatophagoides pteronyssinus -    6 Dermatophagoides farinae -    7 Dog epithelium (canis spp) -    8 Cat hair (addison catus) -    9 Cockroach   (Blatella americana & germanica) -    10 Grass mix midwest   (Deidre, Orchard, Redtop, Jefferson) -    11 Alvarado grass (sorghum halepense) -    12 Weed mix   (common Cocklebur, Lamb s quarters, rough redroot Pigweed, Dock/Sorrel) -    13 Mug wort (artemisia vulgare) -    14 Ragweed giant/short (ambrosia spp) -    15 English Plantain (plantago lanceolata) -    16 Tree mix 1 (Pecan, Maple BHR, Oak RVW, american [place], black [place]) -    17 Red cedar (juniperus virginia) -    18 Tree mix 2   (white Iam, river/red Birch, black [place], common Tobias, american Elm) -    19 Box elder/Maple mix (acer spp) -    20 [place] shagbark (carya ovata) -    F  White flour -      Conclusion: None    Patient had 24 prick tests placed this visit.    Control Tests (2 tests)    Standard Atopic Panel (21 tests)    Personal Products ( 1 tests)    Atopy Screen (Placed 11/22/19 )    No Substance Readings (15 min) Evaluation   POS Histamine 1mg/ml ++    NEG NaCl 0.9% -      No Substance Readings (15 min) Evaluation   1 Alternaria alternata (tenuis)  -    2 Cladosporium herbarum -    3 Aspergillus fumigatus -    4 Penicillium notatum -    5 Dermatophagoides pteronyssinus -    6 Dermatophagoides farinae -    7 Dog epithelium (canis spp) -     8 Cat hair (addison catus) -    9 Cockroach   (Blatella americana & germanica) -    10 Grass mix midwest   (Deidre, Orchard, Redtop, Jefferson) -    11 Alvarado grass (sorghum halepense) -    12 Weed mix   (common Cocklebur, Lamb s quarters, rough redroot Pigweed, Dock/Sorrel) -    13 Mug wort (artemisia vulgare) -    14 Ragweed giant/short (ambrosia spp) -    15 English Plantain (plantago lanceolata) -    16 Tree mix 1 (Pecan, Maple BHR, Oak RVW, american [place], black [place]) -    17 Red cedar (juniperus virginia) -    18 Tree mix 2   (white Iam, river/red Birch, black [place], common [place], american Elm) -    19 Box elder/Maple mix (acer spp) -    20 [place]zel shagbark (carya ovata) -    21 [employer] Gall[employer] Bread Bowl     22 White flour  -      Conclusion: Negative atopy screen prick test. No signs for atopic predisposition.       Milk, Meat, Egg, and Grains (11/22/19 )    No Substance Readings (15min) Evaluation   POS Histamine 1mg/ml ++    NEG NaCl 0.9% -      No Substance Readings (15min) Evaluation   1 Cows milk whole (karina paras) -    2 Casein (karina paras) -    3 Beef (karina paras) -    4 Goat milk -    5 Lamb (ovis wild) -    6 Egg white (gallus gallus) -    7 Egg yolk (gallus gallus) -    8 Chicken (gallus gallus) -    9 Turkey (meleagris gallopavo) -    10 Pork (chema scrofa) -    11 Barley (hordeum vulgare) +    12 Buckwheat (fagopyrum esculentum) -    13 Corn (michael mais) -    14 Hops (humulus lupulus)  +    15 Malt (hordeum vulgare)  +    16 Oat (federico sativa) -    17 Rice (oryza sativa) -    18 Rye (secale cereale) -    19 Whole wheat (triticum aestivum) +    20  White wheat flour - Patients own      Conclusion: There was a certain dermographism on the back with a few food allergens especially on the wheat panel, slightly positive. However, compared to the positive control, the reaction was very slight that I consider it as not relevant.     DRUG ALLERGY TEST SERIES     ANTIBIOTICS      Prick Tests          Substance/ Allergen Conc Result (20 min) Remarks    Histamine Hydrochloride (ALK) 0.1 mg/ml ++     NaCl 0.9% -    1 Cefazolin[1] 100 mg/ml -    2 Ceftriaxone* [2] 100 mg/ml -    3 Benzylpenicillin (Penicillin G) 1 Sea IU/ml (600 mg) -    4 Ampicillin 100mg/ml -    5 Meropenem (Carbapenem) 10 mg/ml -       Intradermal Tests   immed immed delay delay      Substance Conc 1st dil  2nd dil  days  days remarks   1 Cefazolin[1] 1:5 3 mm papule, no erythema -   Same dilution, repeat   2 Ceftriaxone* [2] 1:5 15 mm papule, 30 mm erythema       3 Benzylpenicillin (Penicillin G) 1:100 10 mm papule, 12 mm erythema       4 Ampicillin 1:10 7 mm papule, 10 erythema       5 Meropenem (Carbapenem) 1:10 7 mm papule, no erythema 4 mm papule with erythema   Same dilution, repeat   6 Ceftazidime 1:5 -       * Only with limited indications    *  Methoxyimino-group (crossreactive IgE)  [1]  Cefalexin/Cefazolin-group        [2]    Cefotaxim-group     [3]     Cefuroxim-group      Final diagnosis:    >> Anaphylactic reaction, likely due to a component of [employer] chicken yoly pasta bowl    Previously prescribed emergency set of 2x50mg prednisone and 2x10mg cetirizine for allergic reactions. Instructions: If severe allergic reaction, immediately take two tablets of prednisone 50mg and 2 tablets of cetirizine 10mg and then write precise 12h retrospective diary. If less severe reaction, take only the 2 tablets of cetirizine 10mg.    Recommended to continue avoiding the pasta bowls and yoly-containing items from [employer]    >> Severe immediate-type sensitivity to penicillins, cephalosporins, piperacillin/meropanems    Based on immediate reactions to ceftriaxone and penicillins, would propose patient avoids all penicillins, cephalosporins, or pipercillins/meropanem for safety. However, if cephalosporin use would be vital, propose to use cefazolin and ceftazidime, as these reactions were negative in intradermal tests.    Treatment  prescribed/Plan:  - Provided patient with emergency set containing  2 tablets prednisone 50 mg and 2 tablets cetirizine 10 mg. Patient should have emergency tablets with them at all times in keychain box. If symptoms of allergy recur, patient to take these medications to prevent reaction.    - Reviewed patient's allergy test results and discussed antibiotic options for her upcoming surgery. If patient's physicians decide that a 3rd generation cephalosporin is needed for the procedure, recommend ceftazidim can be given slowly increasing dose (10% dose then wait 30 minutes, then 20% dose then 30 minutes, then 30% dose then 30 minutes, then 40% dose then 30 minutes. If no reaction after 40% of a dose, can proceed with full dose).    - Discussed that non-penicillin antibiotics (gentamycin and neomycin) are usually the antibiotics used in flu vaccinations and that these do not cross react with the patient's allergies. Discussed patient receiving flu shot in clinic if she is still anxious about receiving the vaccination.      Follow up as needed.     Scribe Disclosure  I, Liza Smith, am serving as a scribe to document services personally performed by Dr. Krishna Hines MD, based on data collection and the provider's statements to me.    I spent a total of 15 minutes face to face with Venice Alvarado during today s office visit. Over 50% of this time was spent counseling the patient and/or coordinating care. Please see Assessment and Plan for details.    CC Anni MathiasJohn [facility]  [address] [address] on close of this encounter.              Again, thank you for allowing me to participate in the care of your patient.        Sincerely,        Krishna Hines MD

## 2019-12-17 NOTE — NURSING NOTE
Chief Complaint   Patient presents with     Allergies     Patient has a question about what immunizations she can have and what antibiotic she can have for an upcoming bunion surgery.      Elvi Cardozo LPN

## 2020-03-10 ENCOUNTER — HEALTH MAINTENANCE LETTER (OUTPATIENT)
Age: 45
End: 2020-03-10

## 2020-12-27 ENCOUNTER — HEALTH MAINTENANCE LETTER (OUTPATIENT)
Age: 45
End: 2020-12-27

## 2021-01-16 NOTE — PATIENT INSTRUCTIONS
- Reviewed patient's allergy test results and discussed antibiotic options for her upcoming surgery. If patient's physicians decide that a 3rd generation cephalosporin is needed for the procedure, recommend ceftazidim can be given slowly increasing dose (10% dose then wait 30 minutes, then 20% dose then 30 minutes, then 30% dose then 30 minutes, then 40% dose then 30 minutes. If no reaction after 40% of a dose, can proceed with full dose).    - Discussed that non-penicillin antibiotics (gentamycin and neomycin) are usually the antibiotics used in flu vaccinations and that these do not cross react with the patient's allergies. Discussed patient receiving flu shot in clinic if she is still anxious about receiving the vaccination.  
16-Jan-2021 10:34

## 2021-03-06 ENCOUNTER — HEALTH MAINTENANCE LETTER (OUTPATIENT)
Age: 46
End: 2021-03-06

## 2021-04-24 ENCOUNTER — HEALTH MAINTENANCE LETTER (OUTPATIENT)
Age: 46
End: 2021-04-24

## 2021-10-04 ENCOUNTER — HEALTH MAINTENANCE LETTER (OUTPATIENT)
Age: 46
End: 2021-10-04

## 2022-03-20 ENCOUNTER — HEALTH MAINTENANCE LETTER (OUTPATIENT)
Age: 47
End: 2022-03-20

## 2022-05-15 ENCOUNTER — HEALTH MAINTENANCE LETTER (OUTPATIENT)
Age: 47
End: 2022-05-15

## 2022-09-11 ENCOUNTER — HEALTH MAINTENANCE LETTER (OUTPATIENT)
Age: 47
End: 2022-09-11

## 2023-04-30 ENCOUNTER — HEALTH MAINTENANCE LETTER (OUTPATIENT)
Age: 48
End: 2023-04-30

## 2023-06-03 ENCOUNTER — HEALTH MAINTENANCE LETTER (OUTPATIENT)
Age: 48
End: 2023-06-03

## 2024-07-01 ENCOUNTER — MEDICAL CORRESPONDENCE (OUTPATIENT)
Dept: HEALTH INFORMATION MANAGEMENT | Facility: CLINIC | Age: 49
End: 2024-07-01
Payer: COMMERCIAL

## 2024-07-03 ENCOUNTER — TRANSCRIBE ORDERS (OUTPATIENT)
Dept: OTHER | Age: 49
End: 2024-07-03

## 2024-07-03 DIAGNOSIS — J30.9 ALLERGIC RHINITIS, UNSPECIFIED SEASONALITY, UNSPECIFIED TRIGGER: Primary | ICD-10-CM

## 2024-09-10 ENCOUNTER — TELEPHONE (OUTPATIENT)
Dept: ALLERGY | Facility: CLINIC | Age: 49
End: 2024-09-10
Payer: COMMERCIAL

## 2024-09-10 NOTE — TELEPHONE ENCOUNTER
This patient needs to reschedule their appt with Dr. Hines on Tuesday, 2/18/25 to next available that works for the patient

## 2024-09-17 ENCOUNTER — MYC MEDICAL ADVICE (OUTPATIENT)
Dept: ALLERGY | Facility: CLINIC | Age: 49
End: 2024-09-17
Payer: COMMERCIAL

## 2025-02-19 NOTE — TELEPHONE ENCOUNTER
FUTURE VISIT INFORMATION      FUTURE VISIT INFORMATION:  Date: 5/20/25  Time: 8a  [place] [facility] [place]  REFERRAL INFORMATION:  Referring provider:   Corina Landa MD  Referring providers clinic:  [place]  Reason for visit/diagnosis: J30.9 (ICD-10-CM) - Allergic rhinitis, unspecified seasonality, unspecified trigger     RECORDS REQUESTED FROM:       Clinic name Comments Records Status   [place] 7/1/24 - [facility], [facility]  6/24/24 - [facility], [facility]     Last saw Donny in 2019.

## 2025-03-09 ENCOUNTER — HEALTH MAINTENANCE LETTER (OUTPATIENT)
Age: 50
End: 2025-03-09

## 2025-05-17 ENCOUNTER — HEALTH MAINTENANCE LETTER (OUTPATIENT)
Age: 50
End: 2025-05-17

## 2025-05-20 ENCOUNTER — PRE VISIT (OUTPATIENT)
Dept: ALLERGY | Facility: CLINIC | Age: 50
End: 2025-05-20

## 2025-05-20 ENCOUNTER — OFFICE VISIT (OUTPATIENT)
Dept: ALLERGY | Facility: CLINIC | Age: 50
End: 2025-05-20
Attending: OTOLARYNGOLOGY
Payer: COMMERCIAL

## 2025-05-20 DIAGNOSIS — Z88.9 DRUG ALLERGY, MULTIPLE: Primary | ICD-10-CM

## 2025-05-20 DIAGNOSIS — Z91.018 FOOD ALLERGY: ICD-10-CM

## 2025-05-20 DIAGNOSIS — T78.2XXA ANAPHYLAXIS, INITIAL ENCOUNTER: ICD-10-CM

## 2025-05-20 DIAGNOSIS — J30.9 ALLERGIC RHINITIS, UNSPECIFIED SEASONALITY, UNSPECIFIED TRIGGER: ICD-10-CM

## 2025-05-20 PROCEDURE — 95018 ALL TSTG PERQ&IQ DRUGS/BIOL: CPT | Performed by: DERMATOLOGY

## 2025-05-20 PROCEDURE — 99215 OFFICE O/P EST HI 40 MIN: CPT | Mod: 25 | Performed by: DERMATOLOGY

## 2025-05-20 RX ORDER — NITROFURANTOIN MONOHYDRATE/MACROCRYSTALLINE 25; 75 MG/1; MG/1
CAPSULE ORAL
COMMUNITY
Start: 2024-09-25

## 2025-05-20 RX ORDER — EPINEPHRINE 0.3 MG/.3ML
0.3 INJECTION SUBCUTANEOUS PRN
Qty: 2 EACH | Refills: 2 | Status: SHIPPED | OUTPATIENT
Start: 2025-05-20

## 2025-05-20 RX ORDER — CELECOXIB 100 MG/1
CAPSULE ORAL
COMMUNITY
Start: 2025-04-10

## 2025-05-20 RX ORDER — PROPRANOLOL HYDROCHLORIDE 60 MG/1
60 CAPSULE, EXTENDED RELEASE ORAL DAILY
COMMUNITY
Start: 2024-03-04

## 2025-05-20 RX ORDER — SEMAGLUTIDE 2.68 MG/ML
INJECTION, SOLUTION SUBCUTANEOUS
COMMUNITY
Start: 2024-11-18

## 2025-05-20 RX ORDER — CETIRIZINE HYDROCHLORIDE 10 MG/1
TABLET ORAL
Qty: 30 TABLET | Refills: 1 | Status: SHIPPED | OUTPATIENT
Start: 2025-05-20

## 2025-05-20 RX ORDER — FLUTICASONE PROPIONATE 50 MCG
2 SPRAY, SUSPENSION (ML) NASAL
COMMUNITY
Start: 2024-10-23

## 2025-05-20 RX ORDER — KETOCONAZOLE 20 MG/ML
SHAMPOO, SUSPENSION TOPICAL
COMMUNITY
Start: 2024-01-29

## 2025-05-20 RX ORDER — PREDNISONE 50 MG/1
TABLET ORAL
Qty: 2 TABLET | Refills: 2 | Status: SHIPPED | OUTPATIENT
Start: 2025-05-20

## 2025-05-20 RX ORDER — CYCLOBENZAPRINE HCL 10 MG
TABLET ORAL
COMMUNITY
Start: 2025-04-24

## 2025-05-20 RX ORDER — EPINEPHRINE 0.3 MG/.3ML
INJECTION SUBCUTANEOUS
COMMUNITY
Start: 2024-12-13

## 2025-05-20 RX ORDER — BETAMETHASONE DIPROPIONATE 0.5 MG/G
CREAM TOPICAL
COMMUNITY

## 2025-05-20 RX ORDER — TRAZODONE HYDROCHLORIDE 100 MG/1
100-200 TABLET ORAL
COMMUNITY
Start: 2024-07-22 | End: 2025-07-22

## 2025-05-20 RX ORDER — SUMATRIPTAN SUCCINATE 100 MG/1
100 TABLET ORAL
COMMUNITY
Start: 2025-03-10

## 2025-05-20 RX ORDER — ALPRAZOLAM 0.25 MG
0.25 TABLET ORAL
COMMUNITY
Start: 2025-04-01

## 2025-05-20 RX ORDER — TRIAMCINOLONE ACETONIDE 1 MG/G
CREAM TOPICAL
COMMUNITY
Start: 2024-07-22

## 2025-05-20 RX ORDER — TOPIRAMATE 50 MG
TABLET ORAL
COMMUNITY
Start: 2025-03-10

## 2025-05-20 RX ORDER — LEVOTHYROXINE SODIUM 75 UG/1
TABLET ORAL
COMMUNITY
Start: 2025-02-17

## 2025-05-20 RX ORDER — KETOCONAZOLE 20 MG/G
CREAM TOPICAL
COMMUNITY
Start: 2025-01-21

## 2025-05-20 RX ORDER — PANTOPRAZOLE SODIUM 20 MG
TABLET, DELAYED RELEASE (ENTERIC COATED) ORAL
COMMUNITY
Start: 2024-11-19

## 2025-05-20 RX ORDER — PHENAZOPYRIDINE HYDROCHLORIDE 100 MG/1
TABLET, FILM COATED ORAL
COMMUNITY

## 2025-05-20 RX ORDER — IPRATROPIUM BROMIDE 21 UG/1
2 SPRAY, METERED NASAL EVERY 12 HOURS
COMMUNITY
Start: 2024-06-26

## 2025-05-20 NOTE — LETTER
5/20/2025      Venice Childers  [address] [facility] [address]      Dear Colleague,    Thank you for referring your patient, Venice Childers, to the [facility] [place]. Please see a copy of my visit note below.    [facility] Dermato-allergology Note  Office visit  Encounter Date: May 20, 2025  ____________________________________________      CC: Allergy Consult (Evaluated by MD 6 years ago, PCP not comfortable with prescribing emergency set. Also reports allergy to lice shampoo and tessalon pearls since last seen. Medication allergy list needs to be updated, inaccurate. )    HPI:  (May 20, 2025)  Ms. Venice Childers is a(n) 49 year old female who presents today as new patient for allergy consultation  - Referred by Dr. Corina Landa on 7/3/2024 for allergic rhinitis. Last seen in our clinic in 2019.  - Patient presents to re-establish care for emergency allergy management medication refill, including prednisone and Epi pen. - - - Reports no recent allergy reactions.   - Last allergic reaction was October 2023 w/ Tessalon pearls (tx for covid and bronchitis) with throat and tongue swelling  - She felt like her throat was getting thick, itchy and painful.   - Immediately took 4 benadryls, and resolved after 30 minutes  - Denies difficulty breathing. 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   - Endorses seasonal allergies t/w zyrtec daily  - Symptoms include runny/stuffy nose, postnasal drip and congestion (last dose 6-7 days ago).   - Reports many new drug allergies to reconcile with significant rashes, Hives, and anaphylaxis.   - Otherwise feeling well in usual state of health.  Starting grad school.     Physical Exam:  General: In no acute distress, well-developed, well-nourished  Eyes: no conjunctivitis  ENT: no signs of rhinitis   Pulmonary: no wheezing or coughing  Skin: Focused examination of the skin on test sites was performed = see test results below    Earlier History and Allergy Exams:  (Dec 17, 2019)  Ms. Venice Childers is a 44 year old female who presents as a referral from Dr. Anni Mathias for a second opinion for an anaphylactic reaction she had on 04/23/2019. That morning, the patient was at home. 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 She recalls having a peanut butter sandwich for breakfast and stumbled after putting out the trash due to nerve damage in a leg. She then went to sleep and woke up to have dinner which consisted only of a diet coke and a chicken yoly pasta bowl from [employer] which she was getting for the first time. She has previously had food from [employer] that contains yoly. She consumed a third of the meal and developed pruritus over her whole body 10 minutes later. She then went to go take a shower and noticed her throat starting to close. She called 911 and was given epinephrine by the paramedics which stopped the throat closing. Her blood pressure was 67/43, normally it is around 140/80. On the way to the hospital, she was continued on epinephrine and started on diphenhydramine, methylprednisolone, and D5 1/2 NS. The itching subsided when she got to the hospital. After several hours of monitoring to ensure no reaction developing, she went home. She later contacted the [employer] to see if any of the workers that night were on antibiotics as she is allergic to penicillins. The manager later checked and reported that no one was taking antibiotics. The patient has been avoiding [employer] pasta bowl and yoly-containing food items since the reaction. She has ate from that [employer] since the reaction.     On  05/21/2019, she went to go see an allergist in [place]. Blood tests were conducted to determine if she had any allergies to bees as well as the main ingredients of the meal without any positive results. She was advised to prescribed an epi pen. The patient had a prick test when she was 21-years-old with dust mites, birch trees, and a mold having positive results. Also when she was that age, she developed hives and eyes swelling at 2AM in [place], [place]. The ER doctors there informed her that she may be allergic to MSG as she had Chinese food the night before. The patient denies having hay fever and asthma. She reports that she does have Hashimoto's thyroiditis but no other autoimmune disease.    Patient returns to clinic today for a follow up. She reports she has an upcoming bunion surgery and is concerned about which antibiotics she can be given. She would also like to discuss immunizations in the future No additional concerns discussed.     (Nov 27, 2019)  Patient returns to clinic today for antibiotic prick and intradermal testing. She reports she had ceftriaxone during a surgery about a year ago without reaction. No additional concerns discussed.     (Nov 8, 2019)  Pt comes in today, 11/22/19, for a repeat of the atopy screen prick testing. Last time in this clinic she had undergone atopy screen prick testing but was found to be on antihistamines. She was given an emergency set before rescheduling. Patient's Hx indicates that she has a severe immediate type reaction to penicillins/minor penicillins. There were incidents of droplet exposure with amoxicillin on the hand during preparation of amoxicillin injection, and she developed in 30 minutes generalized urticaria. In addition, she has observed that her  was feeding the dog amoxicillin and touched things in the kitchen.        Past Medical History:   There is no problem list on file for this patient.    No past medical history on  file.    Allergies:  Allergies   Allergen Reactions     Penicillins Hives, Itching and Rash     Immediate type reaction to Penicillins with gen Urticaria  In skin tests (Intradermal) clearly positive after 20min to Penicillin G, Ampicillin, Ceftriaxone and slightly to Meropenem.  AVOID in future all Penicillins and Cephalosporines (incl Meropenem)  If REALLY necessary Cefazolin and/or Ceftazidim can be given (intradermal test negative)     Adhesive Tape Rash     Paper tape       Ciprofloxacin Hives     Erythromycin Hives     Sulfa Antibiotics Hives       Medications:  Current Outpatient Medications   Medication Sig Dispense Refill     ALPRAZolam (XANAX) 0.25 MG tablet Take 0.25 mg by mouth.       CELEBREX 100 MG capsule Take 1 capsule twice a day by oral route.       cyclobenzaprine (FLEXERIL) 10 MG tablet Take 1 tablet every day by oral route at bedtime for 30 days, for jaw pain and headaches..       EPINEPHrine (ANY BX GENERIC EQUIV) 0.3 MG/0.3ML injection 2-pack INJECT THE FULL CONTENTS OF ONE SYRINGE (0.3 ML) INTRAMUSCULARLY ONCE AS NEEDED. MAY REPEAT ONCE WITH OTHER SYRINGE.       fluticasone (FLONASE) 50 MCG/ACT nasal spray 2 sprays.       ipratropium (ATROVENT) 0.03 % nasal spray 2 sprays every 12 hours.       ketoconazole (NIZORAL) 2 % external cream Apply topically.       ketoconazole (NIZORAL) 2 % external shampoo Apply topically.       levothyroxine (SYNTHROID/LEVOTHROID) 75 MCG tablet 75 micrograms every day by oral route.       MACROBID 100 MG capsule 100 mg as needed by oral route.       OZEMPIC, 2 MG/DOSE, 8 MG/3ML pen Inject by subcutaneous route.       phentermine (LOMAIRA) 8 MG tablet Take 4-8 mg by mouth.       predniSONE (DELTASONE) 50 MG tablet Take 2 tablets (100 mg) by mouth daily (Patient taking differently: Take 100 mg by mouth as needed.) 2 tablet 3     propranolol ER (INDERAL LA) 60 MG 24 hr capsule Take 60 mg by mouth daily.       PROTONIX 20 MG EC tablet 40 mg twice a day by oral route.        SUMAtriptan (IMITREX) 100 MG tablet Take 100 mg by mouth.       TOPAMAX 50 MG tablet 50 mg every day by oral route.       traZODone (DESYREL) 100 MG tablet Take 100-200 mg by mouth.       triamcinolone (KENALOG) 0.1 % external cream Apply topically to affected areas (itchy and scaly areas) daily in morning on trunk and extremities until skin is smooth and no longer itchy. Do not use on face, groin and axillae.       betamethasone dipropionate (DIPROSONE) 0.05 % external cream Apply topically.       cetirizine (ZYRTEC) 10 MG tablet Take 1 tablet (10 mg) by mouth as needed for allergies (emergency set) 2 tablet 3     phenazopyridine (PYRIDIUM) 100 MG tablet 100 mg as needed by oral route.       No current facility-administered medications for this visit.       Previous Labs, Allergy Tests, Dermatopathology, [id number] Allergy Panel  House Dust Mites D. Pteronyssinus, IgE  <0.35 kU/L <0.10   Comment: Class = 0   House Dust Mites D. Farinae, IgE  <0.35 kU/L <0.10   Comment: Class = 0   Cat Epithelium, IgE  <0.35 kU/L <0.10   Comment: Class = 0   Dog Dander, IgE  <0.35 kU/L <0.10   Comment: Class = 0   [place] Grass, IgE  <0.35 kU/L <0.10   Comment: Class = 0   [place] (Eugene Blue) Grass, IgE  <0.35 kU/L <0.10   Comment: Class = 0   Cockroach, IgE  <0.35 kU/L <0.10   Comment: Class = 0   Mold Cladosporium Herbarum/Hormode, IgE  <0.35 kU/L <0.10   Comment: Class = 0   Mold Aspergillus Fumigatus, IgE  <0.35 kU/L <0.10   Comment: Class = 0   Mold Alternaria Tenuis, IgE  <0.35 kU/L <0.10   Comment: Class = 0   Ector, IgE  <0.35 kU/L <0.10   Comment: Class = 0   White Iam, IgE  <0.35 kU/L <0.10   Comment: Class = 0   Maple and [place] Tree, IgE  <0.35 kU/L <0.10   Comment: Class = 0   Silver Birch, IgE  <0.35 kU/L <0.10   Comment: Class = 0   Oak Tree, IgE  <0.35 kU/L <0.10   Comment: Class = 0   Elm Tree, IgE  <0.35 kU/L <0.10   Comment: Class = 0   Ragweed Short, IgE  <0.35 kU/L <0.10       Referred By:  Corina Landa MD  [address] [place] [address]     Allergy Tests:  Past Allergy Test    Family History:  No family history on file.    Social History:    Order for Future Allergy Testing:    [x] Outpatient  [] Inpatient: [facility]..../ Bed ...    Skin Atopy (atopic dermatitis)? [x] Yes     [] No  Comments:   Childhood eczema?   [] Yes     [x] No  Comments:   Hand eczema?   [] Yes     [x] No  If yes, leading hand? [] Right     [] Left     [] Ambidextrous  Comments:  Hx in 20s, none since     Contact allergies?     Comments:   Including adhesives/bandages? [x] Yes     [] No  Comments:   Including metals?   [] Yes     [] No  Comments:   Other substances?   [] Yes     [] No  Comments:     Drug allergies?   [x] Yes     [] No  Comments:     Angioedema?   [x] Yes     [] No  Comments:     Urticaria?   [x] Yes     [] No  Comments:     Food allergies?   [x] Yes     [] No  Comments:     Pet allergies?   [] Yes     [x] No  Comments:     Environmental allergy symptoms?  [x] Conjunctivitis  [] Otitis  [] Pharyngitis  [] Polyposis  [x] Postnasal Drip  [x] Rhinitis  [x] Sinusitis  [] None  Comments:     HENT Operations?  [] Adenoids [x] Septum [] Sinus  [x] Tonsils        [x] Other: jaw surgery x 20 years ago  [] None  Comments:     Pulmonary symptoms (from birth to present)?  [] Asthma bronchiale  Inhaler(s)?:   [] Coughing  [] Other  [x] None  Comments:     Environmental and pulmonary symptoms aggravated by?  Season: [] None     [] I     [] II     [] III     [] IV     [] V     [] VI          [] VII     [] VIII     [] IX     [] X     [] XI     [] XII     [] Perennial  Time of Day: [] None     [] Morning     [] Noon     [] Evening     [] Night     [] Whole Day  Location/Changes: [] None     [] Inside     [] Outside     [] Mountain     [] Sea     [] Other:   Triggers (specific): [] None     [] Animals     [] Dust     [] Mold     [] Plants     [] Other:   Triggers (other): [] None     [] Psyche     [] Sport     [] Work     []  Other:   Irritant: [] None     [] Cold     [] Heat     [] Odors     [] Physical Efforts     [] Smoke     [] Other:     Order for PATCH TESTS  Reason for tests (suspected allergy): n/a  Known previous allergies: n/a  Standardized panels  [] Standard panel (40 tests)  [] Preservatives & Antimicrobials (31 tests)  [] Emulsifiers & Additives (25 tests)   [] Perfumes/Flavours & Plants (25 tests)  [] Hairdresser panel (12 tests)  [] Rubber Chemicals (22 tests)  [] Plastics (26 tests)  [] Colorants/Dyes/Food additives (20 tests)  [] Metals (implants/dental) (24 tests)  [] Local anaesthetics/NSAIDs (13 tests)  [] Antibiotics & Antimycotics (14 tests)   [] Corticosteroids (15 tests)   [] Photopatch test (62 tests)   [] Others:     [] Patient's Own Products:   DO NOT test if chemical or biological identity is unknown!   always ask from patient the product information and safety sheets (MSDS)       Order for PRICK TESTS    Reason for tests (suspected allergy): n/a  Known previous allergies: n/a    Standardized prick panels  [] Atopic panel (20 tests)  [] Pediatric Panel (12 tests)  [] Milk, Meat, Eggs, Grains (20 tests)   [] Dust, Epithelia, Feathers (10 tests)  [] Fish, Seafood, Shellfish (17 tests)  [] Nuts, Beans (14 tests)  [] Spice, Vegetable, Fruit (17 tests)  [] Pollen Panel = Tree, Grass, Weed (24 tests)  [] Others:     [] Patient's Own Products:   DO NOT test if chemical or biological identity is unknown!   always ask from patient the product information and safety sheets (MSDS)     Standardized intradermal tests  [] Alternaria alternata  [] Aspergillus fumigatus  [] Cladosporium herbarum   [] Penicillium notatum  [] Dermatophagoides farinae  [] Dermatophagoides pteronyssinus  [] Dog Epithelium  [] Cat Epithelium  [] Others:     [] Bee venom   [] Wasp venom  !!Specific protocol with dilutions!!       Order for Drug allergy tests (prick & intradermal)    [] Penicillin G     [] Ampicillin   [] Cefazolin (1st gen)     []  Cefuroxime (2nd gen)     [] Ceftriaxone (3rd gen)     [] Ceftazidime (3rd gen)     [] Cefepime (4th gen)       [x] Bactrim  [] Iodixanol (Visipaque)     [] Iopamidol (Isovue)       [] Iohexol (Omnipaque)  [x] Others: Ciprofloxacin, Meropenem   [] Patient's Own:   Order for 5/20/2025 as test date      Atopy Screen (Placed 11/09/2019)     No Substance Readings (15 min) Evaluation   POS Histamine 1mg/ml -     NEG NaCl 0.9% -        No Substance Readings (15 min) Evaluation   1 Alternaria alternata (tenuis)  -     2 Cladosporium herbarum -     3 Aspergillus fumigatus -     4 Penicillium notatum -     5 Dermatophagoides pteronyssinus -     6 Dermatophagoides farinae -     7 Dog epithelium (canis spp) -     8 Cat hair (addison catus) -     9 Cockroach   (Blatella americana & germanica) -     10 Grass mix midwest   (Deidre, Orchard, Redtop, Jefferson) -     11 Alvarado grass (sorghum halepense) -     12 Weed mix   (common Cocklebur, Lamb s quarters, rough redroot Pigweed, Dock/Sorrel) -     13 Mug wort (artemisia vulgare) -     14 Ragweed giant/short (ambrosia spp) -     15 English Plantain (plantago lanceolata) -     16 Tree mix 1 (Pecan, Maple BHR, Oak RVW, american [place], black Hanna) -     17 Red cedar (juniperus virginia) -     18 Tree mix 2   (white Iam, river/red Birch, black Riggins, common Lykens, american Elm) -     19 Box elder/Maple mix (acer spp) -     20 [place] shagbark (carya ovata) -     F  White flour -        Conclusion: None     Patient had 24 prick tests placed this visit.  Control Tests (2 tests)  Standard Atopic Panel (21 tests)  Personal Products ( 1 tests)     Atopy Screen (Placed 11/22/19 )     No Substance Readings (15 min) Evaluation   POS Histamine 1mg/ml ++     NEG NaCl 0.9% -        No Substance Readings (15 min) Evaluation   1 Alternaria alternata (tenuis)  -     2 Cladosporium herbarum -     3 Aspergillus fumigatus -     4 Penicillium notatum -     5 Dermatophagoides pteronyssinus -     6  Dermatophagoides farinae -     7 Dog epithelium (canis spp) -     8 Cat hair (addison catus) -     9 Cockroach   (Blatella americana & germanica) -     10 Grass mix midwest   ([place]idre, Orchard, Redtop, Jefferson) -     11 Alvarado grass (sorghum halepense) -     12 Weed mix   (common Cocklebur, Lamb s quarters, rough redroot Pigweed, Dock/Sorrel) -     13 Mug wort (artemisia vulgare) -     14 Ragweed giant/short (ambrosia spp) -     15 English Plantain (plantago lanceolata) -     16 Tree mix 1 (Pecan, Maple BHR, Oak RVW, american [place], black [place]) -     17 Red cedar (juniperus virginia) -     18 Tree mix 2   (white Iam, river/red Birch, black [place], common [place], american Elm) -     19 Box elder/Maple mix (acer spp) -     20 [place] shagbark (carya ovata) -     21 [employer] Gallo Bread Bowl       22 White flour  -        Conclusion: Negative atopy screen prick test. No signs for atopic predisposition.         Milk, Meat, Egg, and Grains (11/22/19 )     No Substance Readings (15min) Evaluation   POS Histamine 1mg/ml ++     NEG NaCl 0.9% -        No Substance Readings (15min) Evaluation   1 Cows milk whole (karina paras) -     2 Casein (karina paras) -     3 Beef (karina paras) -     4 Goat milk -     5 Lamb (ovis wild) -     6 Egg white (gallus gallus) -     7 Egg yolk (gallus gallus) -     8 Chicken (gallus gallus) -     9 Turkey (meleagris gallopavo) -     10 Pork (chema scrofa) -     11 Barley (hordeum vulgare) +     12 Buckwheat (fagopyrum esculentum) -     13 Corn (michale mais) -     14 Hops (humulus lupulus)  +     15 Malt (hordeum vulgare)  +     16 Oat (federico sativa) -     17 Rice (oryza sativa) -     18 Rye (secale cereale) -     19 Whole wheat (triticum aestivum) +     20  White wheat flour - Patients own       Conclusion: There was a certain dermographism on the back with a few food allergens especially on the wheat panel, slightly positive. However, compared to the positive control, the reaction was very slight  that I consider it as not relevant.      DRUG ALLERGY TEST SERIES   (Placed 11/22/2019)                             ANTIBIOTICS    No Substance Readings (15min) Evaluation   POS Histamine 1mg/ml ++    NEG NaCl 0.9% -                                                                                                                                                               Prick Tests               Substance/ Allergen Conc Result (20 min) Remarks   1 Cefazolin[1] 100 mg/ml -     2 Ceftriaxone* [2] 100 mg/ml -     3 Benzylpenicillin (Penicillin G) 1 Sea IU/ml (600 mg) -     4 Ampicillin 100mg/ml -     5 Meropenem (Carbapenem) 10 mg/ml -            Intradermal Tests      immed immed delay delay       Substance Conc 1st dil  2nd dil  days    days   remarks     1 Cefazolin[1] 1:5 3 mm papule, no erythema -     Same dilution, repeat   2 Ceftriaxone* [2] 1:5 15 mm papule, 30 mm erythema           3 Benzylpenicillin (Penicillin G) 1:100 10 mm papule, 12 mm erythema           4 Ampicillin 1:10 7 mm papule, 10 erythema           5 Meropenem (Carbapenem) 1:10 7 mm papule, no erythema 4 mm papule with erythema     Same dilution, repeat          DRUG ALLERGY TEST SERIES May 20, 2025    No Substance Readings (15min) Evaluation   POS Histamine 1mg/ml ++    NEG NaCl 0.9% - Slight dermatographism        Prick Tests         Substance/ Allergen Conc Result (20 min) Remarks    Meropenem (Carbapenem) 100 mg/ml -     Ciprofloxacin 2 mg/ml -     Bactrim 80/400 mg/ml -     Ceftriaxone* [2] 100 mg/ml -       Intradermal Tests   immed immed delay delay      Substance Conc 1st dil  2nd dil  2 days  4 days remarks    Meropenem (Carbapenem) 1:100 -        Ciprofloxacin 1:1000 -        Bactrim 1:100 -        Ceftriaxone* [2] 1:5 -       All tests negatives!  ________________________________    Assessment & Plan:    ==> Final Diagnosis:     # Anaphylactic reaction, likely due to a component of [employer] chicken yoly pasta bowl  -Was prescribed  emergency set    # Severe immediate-type sensitivity to penicillins  - s/p prick and intradermal tests 11/27/19;  immediate reactions to ceftriaxone and penicillins  - s/p prick and intradermal test repeat 05/20/2025 with Meropenem and Ceftriaxone negatives!    These conclusions are made at the best of one's knowledge and belief based on the provided evidence such as patient's history and allergy test results and they can change over time or can be incomplete because of missing information.    ==> Treatment Plan:    >> For IDT test sites from today, patient will monitor sites for the next 2 and 4 days. If there are any delayed reactions, patient will take photos with the ruler provided and send to clinic via [employer] message for review. Be sure to specify RIGHT or LEFT arm.  - Do not apply topical steroids to sites until after 4 total days.    >> Prescribed emergency set with prednisone, cetirizine, and 0.3 mg EpiPen/Auvi-Q. Informational handout provided.     >> If another episode occurs, recommend patient record a 24-hour retrospective diary, noting all diet, medications, activities, etc. to determine possible allergen. Report to clinic via [employer]. If skin reactions, take photos and upload to [employer].   - Recommended to continue avoiding the pasta bowls and yoly-containing items from [employer]     >>  Cephalosporines and Meropenem/Imipenem can be used if necessary. Keep patient for first dose for 30min under observation.    >> Epic allergy list updated:  - Ciprofloxacin and Bactrim entry deleted with the following comment:   >> For completed prick, intradermal, and patch tests, there were no signs for specific immediate or delayed type reactions to Ciprofloxacine and Sulfa drug Bactrim. ==> Can be used if necessary. For 1st dose, keep patient in clinic for observation for 30 minutes.     Procedures Performed: drug allergy tests     Staff Involved: Provider, Staff, Medical Student, and Scribe    Scribe  Disclosure:   I, Gt Waterman, am serving as a scribe to document services personally performed by Krishna Hines MD based on data collection and the provider's statements to me.     Staff Physician Comments:  I was present with the scribe who participated in the documentation of the note. I have verified the history and personally performed the physical exam and medical decision making. I agree with the assessment and plan as documented in the note. I have reviewed and if necessary amended the note.      Krishna Hines MD  Professor  Head of Dermato-Allergy Division  Department of Dermatology  [facility]      Follow-up in Derm-Allergy clinic PRN  ____________________________________________    I spent a total of 45 minutes with Venice Childers. This time was spent counseling the patient and/or coordinating care, explaining the allergy tests, performing allergy tests and assessing the clinical relevance.     Again, thank you for allowing me to participate in the care of your patient.        Sincerely,        Krishna Hines MD    Electronically signed

## 2025-05-20 NOTE — PROGRESS NOTES
[facility] Dermato-allergology Note  Office visit  Encounter Date: May 20, 2025  ____________________________________________      CC: Allergy Consult (Evaluated by MD 6 years ago, PCP not comfortable with prescribing emergency set. Also reports allergy to lice shampoo and tessalon pearls since last seen. Medication allergy list needs to be updated, inaccurate. )    HPI:  (May 20, 2025)  Ms. Venice Childers is a(n) 49 year old female who presents today as new patient for allergy consultation  - Referred by Dr. Corina Landa on 7/3/2024 for allergic rhinitis. Last seen in our clinic in 2019.  - Patient presents to re-establish care for emergency allergy management medication refill, including prednisone and Epi pen. - - - Reports no recent allergy reactions.   - Last allergic reaction was October 2023 w/ Tessalon pearls (tx for covid and bronchitis) with throat and tongue swelling  - She felt like her throat was getting thick, itchy and painful.   - Immediately took 4 benadryls, and resolved after 30 minutes  - Denies difficulty breathing. 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   - Endorses seasonal allergies t/w zyrtec daily  - Symptoms include runny/stuffy nose, postnasal drip and congestion (last dose 6-7 days ago).   - Reports many new drug allergies to reconcile with significant rashes, Hives, and anaphylaxis.   - Otherwise feeling well in usual state of health. Starting grad school.     Physical Exam:  General: In no acute distress, well-developed, well-nourished  Eyes: no conjunctivitis  ENT: no signs of rhinitis   Pulmonary: no wheezing or coughing  Skin: Focused examination of the skin on test sites was  performed = see test results below    Earlier History and Allergy Exams:  (Dec 17, 2019)  Ms. Venice Childers is a 44 year old female who presents as a referral from Dr. Anni Mathias for a second opinion for an anaphylactic reaction she had on 04/23/2019. That morning, the patient was at home. 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 She recalls having a peanut butter sandwich for breakfast and stumbled after putting out the trash due to nerve damage in a leg. She then went to sleep and woke up to have dinner which consisted only of a diet coke and a chicken yoly pasta bowl from [employer] which she was getting for the first time. She has previously had food from [employer] that contains yoly. She consumed a third of the meal and developed pruritus over her whole body 10 minutes later. She then went to go take a shower and noticed her throat starting to close. She called 911 and was given epinephrine by the paramedics which stopped the throat closing. Her blood pressure was 67/43, normally it is around 140/80. On the way to the hospital, she was continued on epinephrine and started on diphenhydramine, methylprednisolone, and D5 1/2 NS. The itching subsided when she got to the hospital. After several hours of monitoring to ensure no reaction developing, she went home. She later contacted the [employer] to see if any of the workers that night were on antibiotics as she is allergic to penicillins. The manager later checked and reported that no one was taking antibiotics. The patient has been avoiding [employer] pasta bowl and yoly-containing food items since the reaction. She has ate from that [employer] since the reaction.     On 05/21/2019, she went to go see an allergist in [place]. Blood tests were conducted to determine if she had any allergies to bees as well as the main ingredients of the meal without any positive results. She was advised to prescribed an epi pen. The patient  had a prick test when she was 21-years-old with dust mites, birch trees, and a mold having positive results. Also when she was that age, she developed hives and eyes swelling at 2AM in [place], [place]. The ER doctors there informed her that she may be allergic to MSG as she had Chinese food the night before. The patient denies having hay fever and asthma. She reports that she does have Hashimoto's thyroiditis but no other autoimmune disease.    Patient returns to clinic today for a follow up. She reports she has an upcoming bunion surgery and is concerned about which antibiotics she can be given. She would also like to discuss immunizations in the future No additional concerns discussed.     (Nov 27, 2019)  Patient returns to clinic today for antibiotic prick and intradermal testing. She reports she had ceftriaxone during a surgery about a year ago without reaction. No additional concerns discussed.     (Nov 8, 2019)  Pt comes in today, 11/22/19, for a repeat of the atopy screen prick testing. Last time in this clinic she had undergone atopy screen prick testing but was found to be on antihistamines. She was given an emergency set before rescheduling. Patient's Hx indicates that she has a severe immediate type reaction to penicillins/minor penicillins. There were incidents of droplet exposure with amoxicillin on the hand during preparation of amoxicillin injection, and she developed in 30 minutes generalized urticaria. In addition, she has observed that her  was feeding the dog amoxicillin and touched things in the kitchen.        Past Medical History:   There is no problem list on file for this patient.    No past medical history on file.    Allergies:  Allergies   Allergen Reactions    Penicillins Hives, Itching and Rash     Immediate type reaction to Penicillins with gen Urticaria  In skin tests (Intradermal) clearly positive after 20min to Penicillin G, Ampicillin, Ceftriaxone and slightly to  Meropenem.  AVOID in future all Penicillins and Cephalosporines (incl Meropenem)  If REALLY necessary Cefazolin and/or Ceftazidim can be given (intradermal test negative)    Adhesive Tape Rash     Paper tape      Ciprofloxacin Hives    Erythromycin Hives    Sulfa Antibiotics Hives       Medications:  Current Outpatient Medications   Medication Sig Dispense Refill    ALPRAZolam (XANAX) 0.25 MG tablet Take 0.25 mg by mouth.      CELEBREX 100 MG capsule Take 1 capsule twice a day by oral route.      cyclobenzaprine (FLEXERIL) 10 MG tablet Take 1 tablet every day by oral route at bedtime for 30 days, for jaw pain and headaches..      EPINEPHrine (ANY BX GENERIC EQUIV) 0.3 MG/0.3ML injection 2-pack INJECT THE FULL CONTENTS OF ONE SYRINGE (0.3 ML) INTRAMUSCULARLY ONCE AS NEEDED. MAY REPEAT ONCE WITH OTHER SYRINGE.      fluticasone (FLONASE) 50 MCG/ACT nasal spray 2 sprays.      ipratropium (ATROVENT) 0.03 % nasal spray 2 sprays every 12 hours.      ketoconazole (NIZORAL) 2 % external cream Apply topically.      ketoconazole (NIZORAL) 2 % external shampoo Apply topically.      levothyroxine (SYNTHROID/LEVOTHROID) 75 MCG tablet 75 micrograms every day by oral route.      MACROBID 100 MG capsule 100 mg as needed by oral route.      OZEMPIC, 2 MG/DOSE, 8 MG/3ML pen Inject by subcutaneous route.      phentermine (LOMAIRA) 8 MG tablet Take 4-8 mg by mouth.      predniSONE (DELTASONE) 50 MG tablet Take 2 tablets (100 mg) by mouth daily (Patient taking differently: Take 100 mg by mouth as needed.) 2 tablet 3    propranolol ER (INDERAL LA) 60 MG 24 hr capsule Take 60 mg by mouth daily.      PROTONIX 20 MG EC tablet 40 mg twice a day by oral route.      SUMAtriptan (IMITREX) 100 MG tablet Take 100 mg by mouth.      TOPAMAX 50 MG tablet 50 mg every day by oral route.      traZODone (DESYREL) 100 MG tablet Take 100-200 mg by mouth.      triamcinolone (KENALOG) 0.1 % external cream Apply topically to affected areas (itchy and scaly  areas) daily in morning on trunk and extremities until skin is smooth and no longer itchy. Do not use on face, groin and axillae.      betamethasone dipropionate (DIPROSONE) 0.05 % external cream Apply topically.      cetirizine (ZYRTEC) 10 MG tablet Take 1 tablet (10 mg) by mouth as needed for allergies (emergency set) 2 tablet 3    phenazopyridine (PYRIDIUM) 100 MG tablet 100 mg as needed by oral route.       No current facility-administered medications for this visit.       Previous Labs, Allergy Tests, Dermatopathology, [id number] Allergy Panel  House Dust Mites D. Pteronyssinus, IgE  <0.35 kU/L <0.10   Comment: Class = 0   House Dust Mites D. Farinae, IgE  <0.35 kU/L <0.10   Comment: Class = 0   Cat Epithelium, IgE  <0.35 kU/L <0.10   Comment: Class = 0   Dog Dander, IgE  <0.35 kU/L <0.10   Comment: Class = 0   [place] Grass, IgE  <0.35 kU/L <0.10   Comment: Class = 0   [place] ([place] Blue) Grass, IgE  <0.35 kU/L <0.10   Comment: Class = 0   Cockroach, IgE  <0.35 kU/L <0.10   Comment: Class = 0   Mold Cladosporium Herbarum/Hormode, IgE  <0.35 kU/L <0.10   Comment: Class = 0   Mold Aspergillus Fumigatus, IgE  <0.35 kU/L <0.10   Comment: Class = 0   Mold Alternaria Tenuis, IgE  <0.35 kU/L <0.10   Comment: Class = 0   [place], IgE  <0.35 kU/L <0.10   Comment: Class = 0   White Iam, IgE  <0.35 kU/L <0.10   Comment: Class = 0   Maple and [place] Tree, IgE  <0.35 kU/L <0.10   Comment: Class = 0   Silver Birch, IgE  <0.35 kU/L <0.10   Comment: Class = 0   Oak Tree, IgE  <0.35 kU/L <0.10   Comment: Class = 0   Elm Tree, IgE  <0.35 kU/L <0.10   Comment: Class = 0   Ragweed Short, IgE  <0.35 kU/L <0.10       Referred By: Corina Landa MD  [address] [place] [address]     Allergy Tests:  Past Allergy Test    Family History:  No family history on file.    Social History:    Order for Future Allergy Testing:    [x] Outpatient  [] Inpatient: [facility]..../ Bed ...    Skin Atopy (atopic dermatitis)? [x] Yes      [] No  Comments:   Childhood eczema?   [] Yes     [x] No  Comments:   Hand eczema?   [] Yes     [x] No  If yes, leading hand? [] Right     [] Left     [] Ambidextrous  Comments:  Hx in 20s, none since     Contact allergies?     Comments:   Including adhesives/bandages? [x] Yes     [] No  Comments:   Including metals?   [] Yes     [] No  Comments:   Other substances?   [] Yes     [] No  Comments:     Drug allergies?   [x] Yes     [] No  Comments:     Angioedema?   [x] Yes     [] No  Comments:     Urticaria?   [x] Yes     [] No  Comments:     Food allergies?   [x] Yes     [] No  Comments:     Pet allergies?   [] Yes     [x] No  Comments:     Environmental allergy symptoms?  [x] Conjunctivitis  [] Otitis  [] Pharyngitis  [] Polyposis  [x] Postnasal Drip  [x] Rhinitis  [x] Sinusitis  [] None  Comments:     HENT Operations?  [] Adenoids [x] Septum [] Sinus  [x] Tonsils        [x] Other: jaw surgery x 20 years ago  [] None  Comments:     Pulmonary symptoms (from birth to present)?  [] Asthma bronchiale  Inhaler(s)?:   [] Coughing  [] Other  [x] None  Comments:     Environmental and pulmonary symptoms aggravated by?  Season: [] None     [] I     [] II     [] III     [] IV     [] V     [] VI          [] VII     [] VIII     [] IX     [] X     [] XI     [] XII     [] Perennial  Time of Day: [] None     [] Morning     [] Noon     [] Evening     [] Night     [] Whole Day  Location/Changes: [] None     [] Inside     [] Outside     [] Mountain     [] Sea     [] Other:   Triggers (specific): [] None     [] Animals     [] Dust     [] Mold     [] Plants     [] Other:   Triggers (other): [] None     [] Psyche     [] Sport     [] Work     [] Other:   Irritant: [] None     [] Cold     [] Heat     [] Odors     [] Physical Efforts     [] Smoke     [] Other:     Order for PATCH TESTS  Reason for tests (suspected allergy): n/a  Known previous allergies: n/a  Standardized panels  [] Standard panel (40 tests)  [] Preservatives &  Antimicrobials (31 tests)  [] Emulsifiers & Additives (25 tests)   [] Perfumes/Flavours & Plants (25 tests)  [] Hairdresser panel (12 tests)  [] Rubber Chemicals (22 tests)  [] Plastics (26 tests)  [] Colorants/Dyes/Food additives (20 tests)  [] Metals (implants/dental) (24 tests)  [] Local anaesthetics/NSAIDs (13 tests)  [] Antibiotics & Antimycotics (14 tests)   [] Corticosteroids (15 tests)   [] Photopatch test (62 tests)   [] Others:     [] Patient's Own Products:   DO NOT test if chemical or biological identity is unknown!   always ask from patient the product information and safety sheets (MSDS)       Order for PRICK TESTS    Reason for tests (suspected allergy): n/a  Known previous allergies: n/a    Standardized prick panels  [] Atopic panel (20 tests)  [] Pediatric Panel (12 tests)  [] Milk, Meat, Eggs, Grains (20 tests)   [] Dust, Epithelia, Feathers (10 tests)  [] Fish, Seafood, Shellfish (17 tests)  [] Nuts, Beans (14 tests)  [] Spice, Vegetable, Fruit (17 tests)  [] Pollen Panel = Tree, Grass, Weed (24 tests)  [] Others:     [] Patient's Own Products:   DO NOT test if chemical or biological identity is unknown!   always ask from patient the product information and safety sheets (MSDS)     Standardized intradermal tests  [] Alternaria alternata  [] Aspergillus fumigatus  [] Cladosporium herbarum   [] Penicillium notatum  [] Dermatophagoides farinae  [] Dermatophagoides pteronyssinus  [] Dog Epithelium  [] Cat Epithelium  [] Others:     [] Bee venom   [] Wasp venom  !!Specific protocol with dilutions!!       Order for Drug allergy tests (prick & intradermal)    [] Penicillin G     [] Ampicillin   [] Cefazolin (1st gen)     [] Cefuroxime (2nd gen)     [] Ceftriaxone (3rd gen)     [] Ceftazidime (3rd gen)     [] Cefepime (4th gen)       [x] Bactrim  [] Iodixanol (Visipaque)     [] Iopamidol (Isovue)       [] Iohexol (Omnipaque)  [x] Others: Ciprofloxacin, Meropenem   [] Patient's Own:   Order for 5/20/2025  as test date      Atopy Screen (Placed 11/09/2019)     No Substance Readings (15 min) Evaluation   POS Histamine 1mg/ml -     NEG NaCl 0.9% -        No Substance Readings (15 min) Evaluation   1 Alternaria alternata (tenuis)  -     2 Cladosporium herbarum -     3 Aspergillus fumigatus -     4 Penicillium notatum -     5 Dermatophagoides pteronyssinus -     6 Dermatophagoides farinae -     7 Dog epithelium (canis spp) -     8 Cat hair (addison catus) -     9 Cockroach   (Blatella americana & germanica) -     10 Grass mix midwest   (Deidre, Orchard, Redtop, Jefferson) -     11 Alvarado grass (sorghum halepense) -     12 Weed mix   (common Cocklebur, Lamb s quarters, rough redroot Pigweed, Dock/Sorrel) -     13 Mug wort (artemisia vulgare) -     14 Ragweed giant/short (ambrosia spp) -     15 English Plantain (plantago lanceolata) -     16 Tree mix 1 (Pecan, Maple BHR, Oak RVW, american [place], black [place]) -     17 Red cedar (juniperus virginia) -     18 Tree mix 2   (white Iam, river/red Birch, black [place], common [place], american Elm) -     19 Box elder/Maple mix (acer spp) -     20 [place] shagbark (carya ovata) -     F  White flour -        Conclusion: None     Patient had 24 prick tests placed this visit.  Control Tests (2 tests)  Standard Atopic Panel (21 tests)  Personal Products ( 1 tests)     Atopy Screen (Placed 11/22/19 )     No Substance Readings (15 min) Evaluation   POS Histamine 1mg/ml ++     NEG NaCl 0.9% -        No Substance Readings (15 min) Evaluation   1 Alternaria alternata (tenuis)  -     2 Cladosporium herbarum -     3 Aspergillus fumigatus -     4 Penicillium notatum -     5 Dermatophagoides pteronyssinus -     6 Dermatophagoides farinae -     7 Dog epithelium (canis spp) -     8 Cat hair (addison catus) -     9 Cockroach   (Blatella americana & germanica) -     10 Grass mix midwest   ([place], [place], [place], [place]) -     11 Alvarado grass (sorghum halepense) -     12 Weed mix   (common  Cocklebur, Hansen s quarters, rough redroot Pigweed, Dock/Sorrel) -     13 Mug wort (artemisia vulgare) -     14 Ragweed giant/short (ambrosia spp) -     15 English Plantain (plantago lanceolata) -     16 Tree mix 1 (Pecan, Maple BHR, Oak RVW, american [place], black [place]) -     17 Red cedar (juniperus virginia) -     18 Tree mix 2   (white Iam, river/red Birch, black [place], common [place], american Elm) -     19 Box elder/Maple mix (acer spp) -     20 [place] shagbark (carya ovata) -     21 [employer] Gallo Bread Bowl       22 White flour  -        Conclusion: Negative atopy screen prick test. No signs for atopic predisposition.         Milk, Meat, Egg, and Grains (11/22/19 )     No Substance Readings (15min) Evaluation   POS Histamine 1mg/ml ++     NEG NaCl 0.9% -        No Substance Readings (15min) Evaluation   1 Cows milk whole (karina paras) -     2 Casein (karina paras) -     3 Beef (karina paras) -     4 Goat milk -     5 Lamb (ovis wild) -     6 Egg white (gallus gallus) -     7 Egg yolk (gallus gallus) -     8 Chicken (gallus gallus) -     9 Turkey (meleagris gallopavo) -     10 Pork (chema scrofa) -     11 Barley (hordeum vulgare) +     12 Buckwheat (fagopyrum esculentum) -     13 Corn (michael mais) -     14 Hops (humulus lupulus)  +     15 Malt (hordeum vulgare)  +     16 Oat (federico sativa) -     17 Rice (oryza sativa) -     18 Rye (secale cereale) -     19 Whole wheat (triticum aestivum) +     20  White wheat flour - Patients own       Conclusion: There was a certain dermographism on the back with a few food allergens especially on the wheat panel, slightly positive. However, compared to the positive control, the reaction was very slight that I consider it as not relevant.      DRUG ALLERGY TEST SERIES   (Placed 11/22/2019)                             ANTIBIOTICS    No Substance Readings (15min) Evaluation   POS Histamine 1mg/ml ++    NEG NaCl 0.9% -                                                                                                                                                                Prick Tests               Substance/ Allergen Conc Result (20 min) Remarks   1 Cefazolin[1] 100 mg/ml -     2 Ceftriaxone* [2] 100 mg/ml -     3 Benzylpenicillin (Penicillin G) 1 Sea IU/ml (600 mg) -     4 Ampicillin 100mg/ml -     5 Meropenem (Carbapenem) 10 mg/ml -            Intradermal Tests      immed immed delay delay       Substance Conc 1st dil  2nd dil  days    days   remarks     1 Cefazolin[1] 1:5 3 mm papule, no erythema -     Same dilution, repeat   2 Ceftriaxone* [2] 1:5 15 mm papule, 30 mm erythema           3 Benzylpenicillin (Penicillin G) 1:100 10 mm papule, 12 mm erythema           4 Ampicillin 1:10 7 mm papule, 10 erythema           5 Meropenem (Carbapenem) 1:10 7 mm papule, no erythema 4 mm papule with erythema     Same dilution, repeat          DRUG ALLERGY TEST SERIES May 20, 2025    No Substance Readings (15min) Evaluation   POS Histamine 1mg/ml ++    NEG NaCl 0.9% - Slight dermatographism        Prick Tests         Substance/ Allergen Conc Result (20 min) Remarks    Meropenem (Carbapenem) 100 mg/ml -     Ciprofloxacin 2 mg/ml -     Bactrim 80/400 mg/ml -     Ceftriaxone* [2] 100 mg/ml -       Intradermal Tests   immed immed delay delay      Substance Conc 1st dil  2nd dil  2 days  4 days remarks    Meropenem (Carbapenem) 1:100 -        Ciprofloxacin 1:1000 -        Bactrim 1:100 -        Ceftriaxone* [2] 1:5 -       All tests negatives!  ________________________________    Assessment & Plan:    ==> Final Diagnosis:     # Anaphylactic reaction, likely due to a component of [employer] chicken yoly pasta bowl  -Was prescribed emergency set    # Severe immediate-type sensitivity to penicillins  - s/p prick and intradermal tests 11/27/19;  immediate reactions to ceftriaxone and penicillins  - s/p prick and intradermal test repeat 05/20/2025 with Meropenem and Ceftriaxone negatives!    These  conclusions are made at the best of one's knowledge and belief based on the provided evidence such as patient's history and allergy test results and they can change over time or can be incomplete because of missing information.    ==> Treatment Plan:    >> For IDT test sites from today, patient will monitor sites for the next 2 and 4 days. If there are any delayed reactions, patient will take photos with the ruler provided and send to clinic via [employer] message for review. Be sure to specify RIGHT or LEFT arm.  - Do not apply topical steroids to sites until after 4 total days.    >> Prescribed emergency set with prednisone, cetirizine, and 0.3 mg EpiPen/Auvi-Q. Informational handout provided.     >> If another episode occurs, recommend patient record a 24-hour retrospective diary, noting all diet, medications, activities, etc. to determine possible allergen. Report to clinic via [employer]. If skin reactions, take photos and upload to [employer].   - Recommended to continue avoiding the pasta bowls and yoly-containing items from [employer]     >>  Cephalosporines and Meropenem/Imipenem can be used if necessary. Keep patient for first dose for 30min under observation.    >> Epic allergy list updated:  - Ciprofloxacin and Bactrim entry deleted with the following comment:   >> For completed prick, intradermal, and patch tests, there were no signs for specific immediate or delayed type reactions to Ciprofloxacine and Sulfa drug Bactrim. ==> Can be used if necessary. For 1st dose, keep patient in clinic for observation for 30 minutes.     Procedures Performed: drug allergy tests     Staff Involved: Provider, Staff, Medical Student, and Scribe    Scribe Disclosure:   I, Gt Waterman, am serving as a scribe to document services personally performed by Krishna Hines MD based on data collection and the provider's statements to me.     Staff Physician Comments:  I was present with the scribe who participated in the  documentation of the note. I have verified the history and personally performed the physical exam and medical decision making. I agree with the assessment and plan as documented in the note. I have reviewed and if necessary amended the note.      Krishna Hines MD  Professor  Head of Dermato-Allergy Division  Department of Dermatology  [facility]      Follow-up in Derm-Allergy clinic PRN  ____________________________________________    I spent a total of 45 minutes with Venice Childers. This time was spent counseling the patient and/or coordinating care, explaining the allergy tests, performing allergy tests and assessing the clinical relevance.

## 2025-05-20 NOTE — NURSING NOTE
Chief Complaint   Patient presents with    Allergy Consult     Evaluated by MD 6 years ago, PCP not comfortable with prescribing emergency set. Also reports allergy to lice shampoo and tessalon pearls since last seen. Medication allergy list needs to be updated, inaccurate.      Sammy Sevilla RN

## 2025-05-20 NOTE — PATIENT INSTRUCTIONS
You can always access your most recent office visit note with this allergy clinic via [facility] MyChart, which contains all of your results from testing performed by Dr. Hines along with the details of the discussed treatment plan.  If you do not have access to [facility], please discuss with a [facility] staff member. Additionally, if your provider is within [facility] or their EMR participates in the [email or web address]) network, they can also access this information.     ____________________________________________     Emergency treatment for patients with severe immediate allergic reactions to drugs, food or insect bites:      The clinical appearance of severe immediate type allergic reactions can range from wheals and hives all over the body (urticaria), to swellings in the face (eyes, lips) to sometimes swellings in the tongue or airways with problems to swallow or breathe. These reactions can end up in cardiovascular reactions with collapse and shock.    Stay calm, avoid running around, and alert other people to help you    Immediately take your emergency medication.   For adults (over 16 years old): 2 tablet of antihistamines (e.g. cetirizine 10 mg or fexofenadine 180 mg) and 100 mg prednisone.   For children (less than 16 years old): 1 tablet of antihistamine (e.g. cetirizine 10 mg or fexofenadine 180 mg) and 50 mg prednisone    Swallow however you can, with or without water. This treatment is usually sufficient for treatment of uncomplicated hives and swellings.       Emergency set in key chain box containing 2 tablets prednisone 50 mg and 2 tablets cetirizine 10 mg.    In case of acute swelling of tongue, trouble swallowing, blocking of the airways, breathing problems, and/or signs of imminent collapse of shock (sweating, weakness, dizziness, paleness), use the adrenalin auto-injector (Epipen   for adults and Epipen   stacy for children). Make an injection into the outer thighs, if necessary through  clothing.                 Seek immediate emergency medical treatment after use if symptoms persist  Write precise and chronological 12h retrospective diary to evaluate later possible triggers        Krishna Hines, [facility], [place], [facility]; 06/08/2022

## (undated) RX ORDER — CIPROFLOXACIN 2 MG/ML
INJECTION, SOLUTION INTRAVENOUS
Status: DISPENSED
Start: 2025-05-20

## (undated) RX ORDER — CEFTRIAXONE SODIUM 250 MG/1
INJECTION, POWDER, FOR SOLUTION INTRAMUSCULAR; INTRAVENOUS
Status: DISPENSED
Start: 2025-05-20

## (undated) RX ORDER — SULFAMETHOXAZOLE AND TRIMETHOPRIM 80; 16 MG/ML; MG/ML
INJECTION INTRAVENOUS
Status: DISPENSED
Start: 2025-05-20

## (undated) RX ORDER — MEROPENEM 500 MG/1
INJECTION, POWDER, FOR SOLUTION INTRAVENOUS
Status: DISPENSED
Start: 2025-05-20